# Patient Record
Sex: MALE | Race: WHITE | NOT HISPANIC OR LATINO | Employment: FULL TIME | URBAN - METROPOLITAN AREA
[De-identification: names, ages, dates, MRNs, and addresses within clinical notes are randomized per-mention and may not be internally consistent; named-entity substitution may affect disease eponyms.]

---

## 2017-05-12 RX ORDER — TRAZODONE HYDROCHLORIDE 50 MG/1
TABLET ORAL
Qty: 60 TABLET | Refills: 0 | Status: SHIPPED | OUTPATIENT
Start: 2017-05-12 | End: 2017-06-15 | Stop reason: SDUPTHER

## 2017-05-17 RX ORDER — LEVOTHYROXINE SODIUM 175 UG/1
175 TABLET ORAL EVERY MORNING
Qty: 90 TABLET | Refills: 3 | Status: SHIPPED | OUTPATIENT
Start: 2017-05-17 | End: 2018-05-16 | Stop reason: SDUPTHER

## 2017-05-17 NOTE — TELEPHONE ENCOUNTER
----- Message from Justa Jaramillo sent at 5/17/2017  8:03 AM CDT -----  Contact: Self [phone]  [facility] pt    Pt is requesting a prescription refill for levothyroxine (SYNTHROID, LEVOTHROID) 175 MCG tablet    [place] [address] [address]   [phone] (Phone)  [phone] (Fax)

## 2017-05-18 ENCOUNTER — OFFICE VISIT (OUTPATIENT)
Dept: INTERNAL MEDICINE | Facility: CLINIC | Age: 32
End: 2017-05-18
Payer: COMMERCIAL

## 2017-05-18 ENCOUNTER — LAB VISIT (OUTPATIENT)
Dept: LAB | Facility: HOSPITAL | Age: 32
End: 2017-05-18
Payer: COMMERCIAL

## 2017-05-18 VITALS
HEIGHT: 74 IN | BODY MASS INDEX: 26.71 KG/M2 | WEIGHT: 208.13 LBS | DIASTOLIC BLOOD PRESSURE: 62 MMHG | OXYGEN SATURATION: 97 % | SYSTOLIC BLOOD PRESSURE: 134 MMHG | TEMPERATURE: 99 F | HEART RATE: 62 BPM | RESPIRATION RATE: 16 BRPM

## 2017-05-18 DIAGNOSIS — E83.51 HYPOCALCEMIA: ICD-10-CM

## 2017-05-18 DIAGNOSIS — J06.9 UPPER RESPIRATORY TRACT INFECTION, UNSPECIFIED TYPE: Primary | ICD-10-CM

## 2017-05-18 DIAGNOSIS — C73 THYROID CARCINOMA: ICD-10-CM

## 2017-05-18 LAB
PTH-INTACT SERPL-MCNC: 15 PG/ML
T4 FREE SERPL-MCNC: 1.59 NG/DL
TSH SERPL DL<=0.005 MIU/L-ACNC: 0.19 UIU/ML

## 2017-05-18 PROCEDURE — 84443 ASSAY THYROID STIM HORMONE: CPT

## 2017-05-18 PROCEDURE — 86800 THYROGLOBULIN ANTIBODY: CPT

## 2017-05-18 PROCEDURE — 1160F RVW MEDS BY RX/DR IN RCRD: CPT | Mod: S$GLB,,, | Performed by: INTERNAL MEDICINE

## 2017-05-18 PROCEDURE — 84439 ASSAY OF FREE THYROXINE: CPT

## 2017-05-18 PROCEDURE — 99213 OFFICE O/P EST LOW 20 MIN: CPT | Mod: S$GLB,,, | Performed by: INTERNAL MEDICINE

## 2017-05-18 PROCEDURE — 83970 ASSAY OF PARATHORMONE: CPT

## 2017-05-18 PROCEDURE — 36415 COLL VENOUS BLD VENIPUNCTURE: CPT

## 2017-05-18 PROCEDURE — 99999 PR PBB SHADOW E&M-EST. PATIENT-LVL III: CPT | Mod: PBBFAC,,, | Performed by: INTERNAL MEDICINE

## 2017-05-18 NOTE — PROGRESS NOTES
Subjective:       Patient ID: Antoni Felipe is a 31 y.o. male.    Chief Complaint: Sinus Problem; Sore Throat; and Cough    Sinus Problem   This is a new problem. The current episode started in the past 7 days. The problem is unchanged. There has been no fever. His pain is at a severity of 2/10. The pain is mild. Associated symptoms include sneezing. Pertinent negatives include no congestion, coughing, headaches, shortness of breath, sinus pressure or sore throat. Past treatments include spray decongestants. The treatment provided mild relief.     Review of Systems   Constitutional: Negative for activity change, appetite change and fever.   HENT: Positive for sneezing. Negative for congestion, postnasal drip, sinus pressure and sore throat.    Respiratory: Negative for cough, shortness of breath and wheezing.    Cardiovascular: Negative for chest pain and palpitations.   Gastrointestinal: Negative for abdominal pain, blood in stool, constipation, diarrhea, nausea and vomiting.   Genitourinary: Negative for decreased urine volume, difficulty urinating, flank pain and frequency.   Musculoskeletal: Negative for arthralgias.   Neurological: Negative for dizziness, weakness and headaches.       Objective:      Physical Exam   Constitutional: He is oriented to person, place, and time. He appears well-developed and well-nourished. No distress.   HENT:   Head: Normocephalic and atraumatic.   Right Ear: External ear normal.   Left Ear: External ear normal.   Eyes: Conjunctivae and EOM are normal. Pupils are equal, round, and reactive to light.   Neck: Normal range of motion. Neck supple. No thyromegaly present.   Cardiovascular: Normal rate and regular rhythm.    Pulmonary/Chest: Effort normal and breath sounds normal.   Abdominal: Soft. Bowel sounds are normal. He exhibits no mass. There is no tenderness. There is no rebound and no guarding.   Musculoskeletal: Normal range of motion.   Lymphadenopathy:     He has  no cervical adenopathy.   Neurological: He is alert and oriented to person, place, and time. He has normal reflexes. He displays normal reflexes. No cranial nerve deficit. He exhibits normal muscle tone. Coordination normal.   Skin: Skin is warm and dry.       Assessment:       1. Upper respiratory tract infection, unspecified type        Plan:   Antoni was seen today for sinus problem, sore throat and cough.    Diagnoses and all orders for this visit:    Upper respiratory tract infection, unspecified type

## 2017-05-20 LAB
THRYOGLOBULIN INTERPRETATION: ABNORMAL
THYROGLOB AB SERPL-ACNC: <1.8 IU/ML
THYROGLOB SERPL-MCNC: 1 NG/ML

## 2017-05-31 ENCOUNTER — TELEPHONE (OUTPATIENT)
Dept: ENDOCRINOLOGY | Facility: CLINIC | Age: 32
End: 2017-05-31

## 2017-05-31 DIAGNOSIS — C73 THYROID CANCER: Primary | ICD-10-CM

## 2017-05-31 DIAGNOSIS — E05.80 THYROTOXICOSIS, EXOGENOUS IATROGENIC: ICD-10-CM

## 2017-05-31 DIAGNOSIS — E83.51 HYPOCALCEMIA: ICD-10-CM

## 2017-05-31 NOTE — TELEPHONE ENCOUNTER
----- Message from Maryellen Walker RN sent at 5/31/2017 10:45 AM CDT -----  Contact: Patient      ----- Message -----  From: Kinsey Lorenzana  Sent: 5/24/2017   2:58 PM  To: [facility] Cruz Staff (Endo)    Patient is requesting a call back to discuss 5/18/2017 lab results.    Please call [phone].

## 2017-05-31 NOTE — TELEPHONE ENCOUNTER
S/p thyroidectomy  Hypocalcemia  PTC, stage I    Occasional numbness of hands, responds to calcium  + fatigue  No anxiety or palpitations.   Has lost weight 86 lbs on ketogenic diet    PLAN:   One tablet daily except on sundays take half a tablet  Repeat your in three months   F/u appointment with any endo  Tg 0.9 - 0.7 ng/ml, most recently 1.0ng/ml    Results for orders placed or performed in visit on 05/18/17   TSH   Result Value Ref Range    TSH 0.193 (L) 0.400 - 4.000 uIU/mL   Thyroglobulin   Result Value Ref Range    Thyroglobulin, Tumor Marker 1.0 (H) ng/mL    Thyroglobulin Antibody Screen <1.8 <4.0 IU/mL    Thyroglobulin Interpretation SEE BELOW    PTH, intact   Result Value Ref Range    PTH, Intact 15.0 9.0 - 77.0 pg/mL   T4, free   Result Value Ref Range    Free T4 1.59 (H) 0.71 - 1.51 ng/dL

## 2017-05-31 NOTE — TELEPHONE ENCOUNTER
----- Message from Maryellen Walker RN sent at 5/31/2017 10:41 AM CDT -----  Contact: Patient      ----- Message -----  From: Kinsey Lorenzana  Sent: 5/24/2017   2:58 PM  To: [facility] Cruz Staff (Endo)    Patient is requesting a call back to discuss 5/18/2017 lab results.    Please call [phone].

## 2017-06-16 RX ORDER — TRAZODONE HYDROCHLORIDE 50 MG/1
TABLET ORAL
Qty: 60 TABLET | Refills: 0 | Status: SHIPPED | OUTPATIENT
Start: 2017-06-16 | End: 2017-07-26 | Stop reason: SDUPTHER

## 2017-07-26 RX ORDER — TRAZODONE HYDROCHLORIDE 50 MG/1
TABLET ORAL
Qty: 60 TABLET | Refills: 0 | Status: SHIPPED | OUTPATIENT
Start: 2017-07-26 | End: 2017-09-08 | Stop reason: SDUPTHER

## 2017-09-08 RX ORDER — TRAZODONE HYDROCHLORIDE 50 MG/1
TABLET ORAL
Qty: 60 TABLET | Refills: 0 | Status: SHIPPED | OUTPATIENT
Start: 2017-09-08 | End: 2017-10-18 | Stop reason: SDUPTHER

## 2017-10-19 RX ORDER — TRAZODONE HYDROCHLORIDE 50 MG/1
TABLET ORAL
Qty: 60 TABLET | Refills: 0 | Status: SHIPPED | OUTPATIENT
Start: 2017-10-19 | End: 2017-11-20 | Stop reason: SDUPTHER

## 2017-11-20 RX ORDER — TRAZODONE HYDROCHLORIDE 50 MG/1
TABLET ORAL
Qty: 60 TABLET | Refills: 0 | Status: SHIPPED | OUTPATIENT
Start: 2017-11-20 | End: 2017-12-26 | Stop reason: SDUPTHER

## 2017-12-27 RX ORDER — TRAZODONE HYDROCHLORIDE 50 MG/1
TABLET ORAL
Qty: 60 TABLET | Refills: 0 | Status: SHIPPED | OUTPATIENT
Start: 2017-12-27 | End: 2018-01-29 | Stop reason: SDUPTHER

## 2018-01-30 RX ORDER — TRAZODONE HYDROCHLORIDE 50 MG/1
TABLET ORAL
Qty: 60 TABLET | Refills: 0 | Status: SHIPPED | OUTPATIENT
Start: 2018-01-30 | End: 2018-03-10 | Stop reason: SDUPTHER

## 2018-03-12 RX ORDER — TRAZODONE HYDROCHLORIDE 50 MG/1
TABLET ORAL
Qty: 60 TABLET | Refills: 0 | Status: SHIPPED | OUTPATIENT
Start: 2018-03-12 | End: 2018-04-08 | Stop reason: SDUPTHER

## 2018-04-09 RX ORDER — TRAZODONE HYDROCHLORIDE 50 MG/1
TABLET ORAL
Qty: 60 TABLET | Refills: 0 | Status: SHIPPED | OUTPATIENT
Start: 2018-04-09 | End: 2018-05-06 | Stop reason: SDUPTHER

## 2018-04-11 RX ORDER — LEVOTHYROXINE SODIUM 175 UG/1
TABLET ORAL
Qty: 90 TABLET | Refills: 3 | OUTPATIENT
Start: 2018-04-11

## 2018-04-30 RX ORDER — LEVOTHYROXINE SODIUM 175 UG/1
TABLET ORAL
Qty: 90 TABLET | Refills: 3 | OUTPATIENT
Start: 2018-04-30

## 2018-04-30 NOTE — TELEPHONE ENCOUNTER
See Dr. Ortiz's lab orders and please please call patient to schedule ordered lab(s). Thank you.

## 2018-05-02 ENCOUNTER — PATIENT MESSAGE (OUTPATIENT)
Dept: ENDOCRINOLOGY | Facility: CLINIC | Age: 33
End: 2018-05-02

## 2018-05-02 DIAGNOSIS — Z00.00 ROUTINE GENERAL MEDICAL EXAMINATION AT A HEALTH CARE FACILITY: Primary | ICD-10-CM

## 2018-05-07 RX ORDER — TRAZODONE HYDROCHLORIDE 50 MG/1
TABLET ORAL
Qty: 60 TABLET | Refills: 0 | Status: SHIPPED | OUTPATIENT
Start: 2018-05-07 | End: 2018-05-15 | Stop reason: SDUPTHER

## 2018-05-15 ENCOUNTER — OFFICE VISIT (OUTPATIENT)
Dept: INTERNAL MEDICINE | Facility: CLINIC | Age: 33
End: 2018-05-15
Attending: FAMILY MEDICINE
Payer: COMMERCIAL

## 2018-05-15 ENCOUNTER — LAB VISIT (OUTPATIENT)
Dept: LAB | Facility: HOSPITAL | Age: 33
End: 2018-05-15
Attending: INTERNAL MEDICINE
Payer: COMMERCIAL

## 2018-05-15 VITALS
SYSTOLIC BLOOD PRESSURE: 114 MMHG | HEART RATE: 85 BPM | OXYGEN SATURATION: 97 % | HEIGHT: 74 IN | DIASTOLIC BLOOD PRESSURE: 68 MMHG | BODY MASS INDEX: 30.16 KG/M2 | WEIGHT: 235 LBS | TEMPERATURE: 98 F

## 2018-05-15 DIAGNOSIS — Z00.00 ANNUAL PHYSICAL EXAM: Primary | ICD-10-CM

## 2018-05-15 DIAGNOSIS — E83.51 HYPOCALCEMIA: ICD-10-CM

## 2018-05-15 DIAGNOSIS — E03.9 HYPOTHYROIDISM (ACQUIRED): ICD-10-CM

## 2018-05-15 DIAGNOSIS — E05.80 THYROTOXICOSIS, EXOGENOUS IATROGENIC: ICD-10-CM

## 2018-05-15 DIAGNOSIS — C73 THYROID CANCER: ICD-10-CM

## 2018-05-15 LAB
ALBUMIN SERPL BCP-MCNC: 4.4 G/DL
ANION GAP SERPL CALC-SCNC: 11 MMOL/L
BUN SERPL-MCNC: 21 MG/DL
CA-I BLDV-SCNC: 1.05 MMOL/L
CALCIUM SERPL-MCNC: 8.7 MG/DL
CHLORIDE SERPL-SCNC: 101 MMOL/L
CO2 SERPL-SCNC: 28 MMOL/L
CREAT SERPL-MCNC: 1.1 MG/DL
EST. GFR  (AFRICAN AMERICAN): >60 ML/MIN/1.73 M^2
EST. GFR  (NON AFRICAN AMERICAN): >60 ML/MIN/1.73 M^2
GLUCOSE SERPL-MCNC: 96 MG/DL
PHOSPHATE SERPL-MCNC: 4.8 MG/DL
POTASSIUM SERPL-SCNC: 4.6 MMOL/L
PTH-INTACT SERPL-MCNC: 26 PG/ML
SODIUM SERPL-SCNC: 140 MMOL/L
TSH SERPL DL<=0.005 MIU/L-ACNC: 1.85 UIU/ML

## 2018-05-15 PROCEDURE — 83970 ASSAY OF PARATHORMONE: CPT

## 2018-05-15 PROCEDURE — 84432 ASSAY OF THYROGLOBULIN: CPT

## 2018-05-15 PROCEDURE — 82330 ASSAY OF CALCIUM: CPT

## 2018-05-15 PROCEDURE — 80069 RENAL FUNCTION PANEL: CPT

## 2018-05-15 PROCEDURE — 99999 PR PBB SHADOW E&M-EST. PATIENT-LVL III: CPT | Mod: PBBFAC,,, | Performed by: FAMILY MEDICINE

## 2018-05-15 PROCEDURE — 84443 ASSAY THYROID STIM HORMONE: CPT

## 2018-05-15 PROCEDURE — 99395 PREV VISIT EST AGE 18-39: CPT | Mod: S$GLB,,, | Performed by: FAMILY MEDICINE

## 2018-05-15 PROCEDURE — 36415 COLL VENOUS BLD VENIPUNCTURE: CPT

## 2018-05-15 RX ORDER — TRAZODONE HYDROCHLORIDE 50 MG/1
50-100 TABLET ORAL NIGHTLY PRN
Qty: 60 TABLET | Refills: 5 | Status: SHIPPED | OUTPATIENT
Start: 2018-05-15 | End: 2018-12-16 | Stop reason: SDUPTHER

## 2018-05-15 NOTE — PROGRESS NOTES
Subjective:       Patient ID: Antoni Felipe is a 32 y.o. male.    Chief Complaint: Medication Refill    Established patient for an annual wellness check/physical exam and also chronic disease management. Specific complaints - see dictation and please see ROS.        Medication Refill   Pertinent negatives include no abdominal pain, arthralgias, chest pain, chills, congestion, coughing, fatigue, fever, headaches, joint swelling, myalgias, neck pain, numbness, rash or weakness.     Review of Systems   Constitutional: Negative for chills, fatigue and fever.   HENT: Negative for congestion and trouble swallowing.    Eyes: Negative for redness.   Respiratory: Negative for cough, chest tightness and shortness of breath.    Cardiovascular: Negative for chest pain, palpitations and leg swelling.   Gastrointestinal: Negative for abdominal pain and blood in stool.   Genitourinary: Negative for hematuria.   Musculoskeletal: Negative for arthralgias, back pain, gait problem, joint swelling, myalgias and neck pain.   Skin: Negative for color change and rash.   Neurological: Negative for tremors, speech difficulty, weakness, numbness and headaches.   Hematological: Negative for adenopathy. Does not bruise/bleed easily.   Psychiatric/Behavioral: Positive for sleep disturbance. Negative for behavioral problems and confusion. The patient is not nervous/anxious.        Objective:      Physical Exam   Constitutional: He is oriented to person, place, and time. He appears well-developed and well-nourished.   Eyes: No scleral icterus.   Neck: Normal range of motion. Neck supple. No JVD present. Carotid bruit is not present. No tracheal deviation present. No thyromegaly present.   Cardiovascular: Normal rate, regular rhythm, normal heart sounds and intact distal pulses.  Exam reveals no gallop and no friction rub.    No murmur heard.  Pulmonary/Chest: Effort normal and breath sounds normal. No respiratory distress. He has no  wheezes. He has no rales.   Abdominal: Soft. Bowel sounds are normal. He exhibits no distension and no mass. There is no tenderness. There is no rebound and no guarding.   Musculoskeletal: He exhibits no edema.   Lymphadenopathy:     He has no cervical adenopathy.   Neurological: He is alert and oriented to person, place, and time. He displays no tremor. No cranial nerve deficit. Coordination and gait normal.   Skin: Skin is warm and dry. No rash noted. He is not diaphoretic. No erythema.   Psychiatric: He has a normal mood and affect. His behavior is normal. Judgment and thought content normal.   Nursing note and vitals reviewed.      Assessment:       1. Annual physical exam    2. Thyroid cancer    3. Hypothyroidism (acquired)    4. Hypocalcemia        Plan:   Antoni was seen today for medication refill.    Diagnoses and all orders for this visit:    Annual physical exam    Thyroid cancer    Hypothyroidism (acquired)    Hypocalcemia  -     Calcium, ionized; Future    Other orders  -     traZODone (DESYREL) 50 MG tablet; Take 1-2 tablets ( mg total) by mouth nightly as needed for Insomnia.      See meds, orders, follow up, routing and instructions sections of encounter.    This patient is in for an annual physical examination.  He states he is having   trouble getting in with the Endocrinology Service.  They have been managing his   thyroid medication and laboratory status post thyroidectomy for papillary   carcinoma.  His last laboratory demonstrated elevated FT4 and decreased TSH.  It   is unclear what the goal levels are.  The patient states that he had some   appointments with new providers in the Endocrinology Service and those   appointments were periodically cancelled.  He has a pending appointment with Dr. Leslie in approximately one month and runs out of medicines in approximately   four days.    His condition is complicated by hypocalcemia secondary to possible parathyroid   replacement issue.  He  states a ketogenic diet will help his symptoms and he is   not currently taking calcitriol.    RECOMMENDATIONS:  Check ionized calcium today by me and complete his pending   Endocrinology Service laboratory.    I explained that I would like to continue have Endocrinology manage his   post-surgical hypothyroidism given that we do not have direct guidance on his   goal levels be it complete euthyroid status or suppression over control.    Awaiting laboratory.  Further recommendations to follow.  I did urge him to keep   his appointment with Dr. Leslie in June and will curtsey copy our Endocrinology   providers.      LORNA/ABMIKA  dd: 05/16/2018 06:00:38 (CDT)  td: 05/16/2018 13:39:42 (CDT)  Doc ID   #[id number]  Job ID #[id number]    CC:        Cc Dr. Leslie

## 2018-05-15 NOTE — Clinical Note
and Angel were following - patient had Dr. Ledesma standing labs done today. His med refills had been going through the endocrinology department. He has an appt with you in June and says that he might have trouble making that. States he needs synthroid refilled. Thank you. AUSTEN

## 2018-05-16 ENCOUNTER — TELEPHONE (OUTPATIENT)
Dept: ENDOCRINOLOGY | Facility: CLINIC | Age: 33
End: 2018-05-16

## 2018-05-16 ENCOUNTER — TELEPHONE (OUTPATIENT)
Dept: INTERNAL MEDICINE | Facility: CLINIC | Age: 33
End: 2018-05-16

## 2018-05-16 RX ORDER — LEVOTHYROXINE SODIUM 175 UG/1
175 TABLET ORAL EVERY MORNING
Qty: 90 TABLET | Refills: 0 | Status: SHIPPED | OUTPATIENT
Start: 2018-05-16 | End: 2018-05-20

## 2018-05-16 NOTE — TELEPHONE ENCOUNTER
Please advise patient I looked at his labs. His calcium levels remain low normal to slightly low. I recommend that he take scheduled calcium carbonate 500 mg twice daily with meals. His TSH is normal, so I recommend he continue the current dose of thyroid hormone as prescribed by Dr. Mackenzie. Please encourage him to make the appt with me as he hasn't been seen by endocrinology since 5/2016. I am awaiting the result of the thyroglobulin level.

## 2018-05-16 NOTE — TELEPHONE ENCOUNTER
----- Message from Devin Chacon MD sent at 5/16/2018 10:56 AM CDT -----  His  TSH was normal, so I sent in his standard dose today. The ionized Ca was a little low. Thank you.  ----- Message -----  From: Lázaro Leslie MD  Sent: 5/16/2018   9:31 AM  To: Devin Chacon MD    Thank you for letting me know. I'll send in a refill for the thyroid hormone.   ----- Message -----  From: Devin Chacon MD  Sent: 5/15/2018   3:36 PM  To: MD Terry Belcher and Angel were following - patient had Dr. Ledesma standing labs done today. His med refills had been going through the endocrinology department. He has an appt with you in June and says that he might have trouble making that. States he needs synthroid refilled. Thank you. MM

## 2018-05-16 NOTE — TELEPHONE ENCOUNTER
Called patient and discussed labs and or test results. Patient expressed understanding and had the opportunity to ask questions. Any questions were answered. See meds, orders, follow up and instructions sections of encounter.    Specific issues include:  Labs - ion ized Ca low, standart Ca OK - has appt w/ Anuj Jacobson next month, urged him to keep it. Will RF meds for 90 days - TSH in nml range.

## 2018-05-16 NOTE — TELEPHONE ENCOUNTER
Spoke to patient and let him know that Dr Leslie looked at his labs. His calcium levels remain low normal to slightly low. He recommend that he take scheduled calcium carbonate 500 mg twice daily with meals. His TSH is normal, so he recommend he continue the current dose of thyroid hormone as prescribed by Dr. Mackenzie. Please encourage him to make the appt with me as he hasn't been seen by endocrinology since 5/2016. He is awaiting the result of the thyroglobulin level.

## 2018-05-17 LAB
THRYOGLOBULIN INTERPRETATION: ABNORMAL
THYROGLOB AB SERPL-ACNC: <1.8 IU/ML
THYROGLOB SERPL-MCNC: 1.9 NG/ML

## 2018-05-20 RX ORDER — LEVOTHYROXINE SODIUM 175 UG/1
TABLET ORAL
Qty: 90 TABLET | Refills: 1 | Status: SHIPPED | OUTPATIENT
Start: 2018-05-20 | End: 2018-06-22 | Stop reason: SDUPTHER

## 2018-05-21 ENCOUNTER — PATIENT MESSAGE (OUTPATIENT)
Dept: ENDOCRINOLOGY | Facility: CLINIC | Age: 33
End: 2018-05-21

## 2018-05-21 DIAGNOSIS — C73 THYROID CANCER: Primary | ICD-10-CM

## 2018-05-22 ENCOUNTER — PATIENT MESSAGE (OUTPATIENT)
Dept: ENDOCRINOLOGY | Facility: CLINIC | Age: 33
End: 2018-05-22

## 2018-06-22 ENCOUNTER — OFFICE VISIT (OUTPATIENT)
Dept: ENDOCRINOLOGY | Facility: CLINIC | Age: 33
End: 2018-06-22
Payer: COMMERCIAL

## 2018-06-22 VITALS
BODY MASS INDEX: 29.68 KG/M2 | HEIGHT: 74 IN | WEIGHT: 231.25 LBS | HEART RATE: 68 BPM | SYSTOLIC BLOOD PRESSURE: 116 MMHG | DIASTOLIC BLOOD PRESSURE: 68 MMHG

## 2018-06-22 DIAGNOSIS — E89.0 POSTOPERATIVE HYPOTHYROIDISM: ICD-10-CM

## 2018-06-22 DIAGNOSIS — E83.51 HYPOCALCEMIA: ICD-10-CM

## 2018-06-22 DIAGNOSIS — C73 THYROID CANCER: Primary | ICD-10-CM

## 2018-06-22 PROCEDURE — 99999 PR PBB SHADOW E&M-EST. PATIENT-LVL III: CPT | Mod: PBBFAC,,, | Performed by: INTERNAL MEDICINE

## 2018-06-22 PROCEDURE — 99214 OFFICE O/P EST MOD 30 MIN: CPT | Mod: S$GLB,,, | Performed by: INTERNAL MEDICINE

## 2018-06-22 RX ORDER — LEVOTHYROXINE SODIUM 175 UG/1
TABLET ORAL
Qty: 96 TABLET | Refills: 3 | Status: SHIPPED | OUTPATIENT
Start: 2018-06-22 | End: 2018-07-28 | Stop reason: SDUPTHER

## 2018-06-22 NOTE — PROGRESS NOTES
Subjective:      Patient ID: Cuauhtemoc Lane is a 32 y.o. male.    Chief Complaint:  Thyroid Problem      History of Present Illness  Mr. Lane presents for follow up of thyroid cancer and postoperative hypothyroidism.     Pt is here for management of differentiated thyroid cancer and postsurgical hypothyroidism.  S/p thyroidectomy 6/2015    FINAL PATHOLOGIC DIAGNOSIS  1. TOTAL THYROIDECTOMY SPECIMEN SHOWING 3 FOCI OF PAPILLARY CARCINOMA. 1 IS IN THE  RIGHT LOBE, ONE IN THE ISTHMUS AND ONE IS IN THE LEFT LOBE. SEE SYNOPTIC REPORT:  PROCEDURE: TOTAL THYROIDECTOMY  LYMPH NODE SAMPLING: CENTRAL NECK DISSECTION  TUMOR FOCALITY: MULTIFOCAL (3)  TUMOR LATERALITY: RIGHT LOBE, LEFT LOBE AND ISTHMUS  TUMOR SIZE: 1.1 CM  HISTOLOGIC TYPE: PAPILLARY CARCINOMA, CLASSICAL  MARGINS: UNINVOLVED BY PAPILLARY CARCINOMA. THE TUMOR APPROACHES BUT DOES NOT  INVOLVE THE OVERLYING INKED SURFACE IN BOTH THE RIGHT LOBE AND ISTHMUS.  ANGIOINVASION: NOT IDENTIFIED  LYMPHATIC INVASION: NOT IDENTIFIED  EXTRATHYROIDAL EXTENSION: NOT IDENTIFIED  TUMOR STAGE: pT1 b N1a  2. SPECIMEN LABELED CENTRAL NECK CONTENTS SHOWS 2 OUT OF 3 (2/3) LYMPH NODES WITH  METASTATIC PAPILLARY CARCINOMA.    No family history of thyroid cancer.  No history of head or neck irradiation.     Still with mild hypocalcemia.  PTH is detectable but inappropriately in the low normal range  Gets occasional numbness and tingling on the nasal bridge every 3-4 months, no perioral numbness/tingling or muscle cramping  Calcium carbonate 500 mg daily with dinner     No neck pain or dysphagia    Currently taking 175 mcg of levothyroxine daily. Takes thyroid hormone on empty stomach and separates from other foods and meds. Doesn't miss any doses.    Has always felt sluggish since surgery. Weight fluctuates. Regular BM's. No heat or cold intolerance.  No heart palpitations, tremors or increased anxiousness.      Results for [address] (MRN [id number]) as of 6/22/2018  07:44   Ref. Range 4/23/2016 08:30 9/27/2016 10:20 5/18/2017 17:01 5/15/2018 15:51   Thyroglobulin Antibody Screen Latest Ref Range: <4.0 IU/mL <1.8  <1.8 <1.8   Thyroglobulin, Tumor Marker Latest Units: ng/mL 0.7 (H)  1.0 (H) 1.9 (H)       Review of Systems   Constitutional: Negative for chills and fever.   Gastrointestinal: Negative for nausea and vomiting.   No CP  No SOB    Objective:   Physical Exam   Nursing note and vitals reviewed.  No thyroid tissue palpated  No tremor  DTR's 2 +  Heart RRR  Lungs CTA b/l    Lab Review:   Results for [address] (MRN [id number]) as of 6/22/2018 07:27   Ref. Range 9/27/2016 10:20 5/18/2017 17:01 5/15/2018 15:51   Sodium Latest Ref Range: 136 - 145 mmol/L 140  140   Potassium Latest Ref Range: 3.5 - 5.1 mmol/L 4.3  4.6   Chloride Latest Ref Range: 95 - 110 mmol/L 103  101   CO2 Latest Ref Range: 23 - 29 mmol/L 29  28   Anion Gap Latest Ref Range: 8 - 16 mmol/L 8  11   BUN, Bld Latest Ref Range: 6 - 20 mg/dL 15  21 (H)   Creatinine Latest Ref Range: 0.5 - 1.4 mg/dL 0.9  1.1   eGFR if non African American Latest Ref Range: >60 mL/min/1.73 m^2 >60.0  >60.0   eGFR if African American Latest Ref Range: >60 mL/min/1.73 m^2 >60.0  >60.0   Glucose Latest Ref Range: 70 - 110 mg/dL 103  96   Calcium Latest Ref Range: 8.7 - 10.5 mg/dL 8.3 (L)  8.7   Calcium, Ion Latest Ref Range: 1.06 - 1.42 mmol/L   1.05 (L)   Phosphorus Latest Ref Range: 2.7 - 4.5 mg/dL   4.8 (H)   Albumin Latest Ref Range: 3.5 - 5.2 g/dL   4.4   Triglycerides Latest Ref Range: 30 - 150 mg/dL 143     Cholesterol Latest Ref Range: 120 - 199 mg/dL 158     HDL Latest Ref Range: 40 - 75 mg/dL 34 (L)     LDL Cholesterol Latest Ref Range: 63.0 - 159.0 mg/dL 95.4     Total Cholesterol/HDL Ratio Latest Ref Range: 2.0 - 5.0  4.6     TSH Latest Ref Range: 0.400 - 4.000 uIU/mL  0.193 (L) 1.852   Free T4 Latest Ref Range: 0.71 - 1.51 ng/dL  1.59 (H)    Thyroglobulin Interpretation Unknown  SEE BELOW SEE BELOW    Thyroglobulin Antibody Screen Latest Ref Range: <4.0 IU/mL  <1.8 <1.8   Thyroglobulin, Tumor Marker Latest Units: ng/mL  1.0 (H) 1.9 (H)   PTH Latest Ref Range: 9.0 - 77.0 pg/mL  15.0 26.0       Assessment:     1. Thyroid cancer    2. Hypocalcemia    3. Postoperative hypothyroidism      Plan:     1.) Patient with papillary thyroid cancer stage 1, YESI low risk for recurrence  --S/p total thyroidectomy in 2015 with 2 of 3 CN nodes positive for mets  --Was decided not to give him COSTA up front due to YESI low risk category  --Had stable low level Tg consistent with remnant but Tg now trending up  --Will check thyroid ultrasound as soon as able to look for evidence of structural disease  --Will aim to keep TSH moderately suppressed  ---If not structural disease and Tg goes back to baseline with suppression may observe, if Tg continues to trend up may consider COSTA ablation     2. Mild postsurgical hypoparathyroidism  --To continue calcium carbonate 500 mg daily    3.) Biochemically and clinically euthyroid but aiming for TSH suppression  --Increase levothyroxine to 175 mcg Mon-Sat with 1.5 tablets on Sunday  --Repeat TFT's in 4-6 weeks    RTC in 6 months    Lázaro Leslie M.D. Staff Endocrinology

## 2018-07-25 ENCOUNTER — TELEPHONE (OUTPATIENT)
Dept: ENDOCRINOLOGY | Facility: CLINIC | Age: 33
End: 2018-07-25

## 2018-07-25 DIAGNOSIS — C73 THYROID CANCER: Primary | ICD-10-CM

## 2018-07-25 NOTE — TELEPHONE ENCOUNTER
----- Message from Melba Peralta sent at 7/25/2018 10:10 AM CDT -----  Pr seeing ander 9/14 .... ultrasoound on 8/31 being booked out .   Can pt have done in  Radiology ??   Call me [phone]

## 2018-07-28 ENCOUNTER — LAB VISIT (OUTPATIENT)
Dept: LAB | Facility: HOSPITAL | Age: 33
End: 2018-07-28
Attending: INTERNAL MEDICINE
Payer: COMMERCIAL

## 2018-07-28 ENCOUNTER — PATIENT MESSAGE (OUTPATIENT)
Dept: ENDOCRINOLOGY | Facility: CLINIC | Age: 33
End: 2018-07-28

## 2018-07-28 DIAGNOSIS — E83.51 HYPOCALCEMIA: ICD-10-CM

## 2018-07-28 DIAGNOSIS — E89.0 POSTOPERATIVE HYPOTHYROIDISM: Primary | ICD-10-CM

## 2018-07-28 DIAGNOSIS — C73 THYROID CANCER: ICD-10-CM

## 2018-07-28 DIAGNOSIS — E89.0 POSTOPERATIVE HYPOTHYROIDISM: ICD-10-CM

## 2018-07-28 LAB
25(OH)D3+25(OH)D2 SERPL-MCNC: 34 NG/ML
T4 FREE SERPL-MCNC: 1.36 NG/DL
TSH SERPL DL<=0.005 MIU/L-ACNC: 1.62 UIU/ML

## 2018-07-28 PROCEDURE — 84439 ASSAY OF FREE THYROXINE: CPT

## 2018-07-28 PROCEDURE — 84443 ASSAY THYROID STIM HORMONE: CPT

## 2018-07-28 PROCEDURE — 82306 VITAMIN D 25 HYDROXY: CPT

## 2018-07-28 PROCEDURE — 36415 COLL VENOUS BLD VENIPUNCTURE: CPT

## 2018-07-28 RX ORDER — LEVOTHYROXINE SODIUM 200 UG/1
200 TABLET ORAL DAILY
Qty: 90 TABLET | Refills: 3 | Status: SHIPPED | OUTPATIENT
Start: 2018-07-28 | End: 2018-08-06 | Stop reason: SDUPTHER

## 2018-07-30 ENCOUNTER — PATIENT MESSAGE (OUTPATIENT)
Dept: ENDOCRINOLOGY | Facility: CLINIC | Age: 33
End: 2018-07-30

## 2018-08-06 RX ORDER — LEVOTHYROXINE SODIUM 200 UG/1
200 TABLET ORAL DAILY
Qty: 90 TABLET | Refills: 3 | Status: SHIPPED | OUTPATIENT
Start: 2018-08-06 | End: 2018-08-31

## 2018-08-27 ENCOUNTER — LAB VISIT (OUTPATIENT)
Dept: LAB | Facility: HOSPITAL | Age: 33
End: 2018-08-27
Attending: INTERNAL MEDICINE
Payer: COMMERCIAL

## 2018-08-27 DIAGNOSIS — E89.0 POSTOPERATIVE HYPOTHYROIDISM: ICD-10-CM

## 2018-08-27 LAB — TSH SERPL DL<=0.005 MIU/L-ACNC: 1.91 UIU/ML

## 2018-08-27 PROCEDURE — 84443 ASSAY THYROID STIM HORMONE: CPT

## 2018-08-27 PROCEDURE — 36415 COLL VENOUS BLD VENIPUNCTURE: CPT

## 2018-08-31 ENCOUNTER — PATIENT MESSAGE (OUTPATIENT)
Dept: ENDOCRINOLOGY | Facility: CLINIC | Age: 33
End: 2018-08-31

## 2018-08-31 DIAGNOSIS — E89.0 POSTOPERATIVE HYPOTHYROIDISM: Primary | ICD-10-CM

## 2018-08-31 RX ORDER — LEVOTHYROXINE SODIUM 200 UG/1
TABLET ORAL
Qty: 96 TABLET | Refills: 3 | Status: SHIPPED | OUTPATIENT
Start: 2018-08-31 | End: 2019-07-22

## 2018-09-04 ENCOUNTER — HOSPITAL ENCOUNTER (OUTPATIENT)
Dept: ENDOCRINOLOGY | Facility: CLINIC | Age: 33
Discharge: HOME | End: 2018-09-04
Attending: INTERNAL MEDICINE
Payer: COMMERCIAL

## 2018-09-04 DIAGNOSIS — C73 THYROID CANCER: ICD-10-CM

## 2018-09-04 PROCEDURE — 76536 US EXAM OF HEAD AND NECK: CPT | Mod: S$GLB,,, | Performed by: INTERNAL MEDICINE

## 2018-09-11 ENCOUNTER — PATIENT MESSAGE (OUTPATIENT)
Dept: ENDOCRINOLOGY | Facility: CLINIC | Age: 33
End: 2018-09-11

## 2018-09-12 ENCOUNTER — PATIENT MESSAGE (OUTPATIENT)
Dept: ENDOCRINOLOGY | Facility: CLINIC | Age: 33
End: 2018-09-12

## 2018-11-06 NOTE — PROGRESS NOTES
Subjective:      Patient ID: Antoni Felipe is a 32 y.o. male.    Chief Complaint:  Acquired hypothyroidism    History of Present Illness:    Acquired Hypothyroidism:  A 31 yo man with papillary thyroid cancer s/p total thyroidectomy (June, 2015), presents for follow up.    He was diagnosed with papillary thyroid cancer stage 1, YESI low risk for recurrence. Thus he didn't receive COSTA post-operatively.    Pt is currently taking levothyroxine 200 mcg Monday thru Sat, and 300 mcg on Sundays.   Reports taking it on an empty stomach every morning.     Reports fatigue, most likely work related (works 16 hour shifts and timings vary everyday).  Denies weight gain, constipation, hair loss, brittle nails.    No family history of thyroid cancer.  No history of head or neck irradiation.    Mild surgical hypoparathyroidism:  -Reports symptoms of numbness & tingling in the face about once a week to once a month depending on his diet.   -Doesn't take calcium supplements daily. Reports taking 3-4 calcium carbonate+ vitamin D3 tablets when symptomatic.         Review of Systems   Constitutional: Positive for fatigue. Negative for unexpected weight change.   Eyes: Negative for visual disturbance.   Respiratory: Negative for shortness of breath.    Cardiovascular: Negative for chest pain.   Gastrointestinal: Negative for abdominal pain.   Genitourinary: Negative for urgency.   Musculoskeletal: Negative for arthralgias.   Skin: Negative for wound.   Neurological: Negative for headaches.   Hematological: Does not bruise/bleed easily.   Psychiatric/Behavioral: Negative for sleep disturbance.       Objective:   Physical Exam   Constitutional: He is oriented to person, place, and time. He appears well-developed and well-nourished.   HENT:   Head: Normocephalic and atraumatic.   Neck: Neck supple. No thyromegaly present.   Cardiovascular: Normal rate, regular rhythm and normal heart sounds.   Pulmonary/Chest: Effort normal. No  respiratory distress.   Abdominal: Soft. There is no tenderness.   Neurological: He is alert and oriented to person, place, and time.   Skin: Skin is warm. No rash noted.   Psychiatric: He has a normal mood and affect. His behavior is normal.       Lab Review:   Results for [address] (MRN [id number]) as of 11/6/2018 10:57   Ref. Range 5/15/2018 15:51 7/28/2018 08:14 8/27/2018 07:47   TSH Latest Ref Range: 0.400 - 4.000 uIU/mL 1.852 1.623 1.906   Free T4 Latest Ref Range: 0.71 - 1.51 ng/dL  1.36    Thyroglobulin Interpretation Unknown SEE BELOW     Thyroglobulin Antibody Screen Latest Ref Range: <4.0 IU/mL <1.8     Thyroglobulin, Tumor Marker Latest Units: ng/mL 1.9 (H)     PTH Latest Ref Range: 9.0 - 77.0 pg/mL 26.0       Thyroid US (9/6/18)    Impression:  Structure consistent with lymph node in L thyroid bed with higher risk characteristics (height/width ratio >0.7 in AP view, lack of hilum).  There is another lymph node in L level IIA without clear hilum.  Assessment & Plan:   Postoperative hypothyroidism  Pt is clinically & biochemically euthyroid    -Continue Levothyroxine 200 mcg Monday thru Sat, and 300 mcg on Sundays.   -Will check TFTs   -Goal TSH: 0.1-0.5 pending FNA. If pathology is consistent with malignancy, would aim for TSH<0.1  -Reviewed thyroid US. Will schedule FNA thyroid for 11/9.    Hypocalcemia  Pt has intermittent symptoms of hypocalcemia secondary to postsurgical hypoparathyroidism.     -He is not compliant with calcium carbonate. Reports taking it only when symptomatic.   -Recommended taking 1 tab calcium carbonate 500 mg daily  -Will check serum calcium level and phosphorus level    Thyroid cancer  S/p total thyroidectomy    -Will check thyroglobulin level today      Discussed with Dr. Leslie

## 2018-11-07 ENCOUNTER — OFFICE VISIT (OUTPATIENT)
Dept: ENDOCRINOLOGY | Facility: CLINIC | Age: 33
End: 2018-11-07
Payer: COMMERCIAL

## 2018-11-07 ENCOUNTER — LAB VISIT (OUTPATIENT)
Dept: LAB | Facility: HOSPITAL | Age: 33
End: 2018-11-07
Payer: COMMERCIAL

## 2018-11-07 VITALS
HEIGHT: 74 IN | DIASTOLIC BLOOD PRESSURE: 72 MMHG | BODY MASS INDEX: 29.65 KG/M2 | SYSTOLIC BLOOD PRESSURE: 120 MMHG | WEIGHT: 231 LBS | HEART RATE: 70 BPM

## 2018-11-07 DIAGNOSIS — E89.0 POSTOPERATIVE HYPOTHYROIDISM: ICD-10-CM

## 2018-11-07 DIAGNOSIS — E83.51 HYPOCALCEMIA: ICD-10-CM

## 2018-11-07 DIAGNOSIS — C73 THYROID CANCER: ICD-10-CM

## 2018-11-07 DIAGNOSIS — E83.51 HYPOCALCEMIA: Primary | ICD-10-CM

## 2018-11-07 LAB
ALBUMIN SERPL BCP-MCNC: 4.2 G/DL
ANION GAP SERPL CALC-SCNC: 7 MMOL/L
BUN SERPL-MCNC: 16 MG/DL
CALCIUM SERPL-MCNC: 8.5 MG/DL
CHLORIDE SERPL-SCNC: 103 MMOL/L
CO2 SERPL-SCNC: 30 MMOL/L
CREAT SERPL-MCNC: 0.9 MG/DL
EST. GFR  (AFRICAN AMERICAN): >60 ML/MIN/1.73 M^2
EST. GFR  (NON AFRICAN AMERICAN): >60 ML/MIN/1.73 M^2
GLUCOSE SERPL-MCNC: 94 MG/DL
PHOSPHATE SERPL-MCNC: 4.1 MG/DL
POTASSIUM SERPL-SCNC: 4.6 MMOL/L
SODIUM SERPL-SCNC: 140 MMOL/L
T4 FREE SERPL-MCNC: 1.51 NG/DL
TSH SERPL DL<=0.005 MIU/L-ACNC: 0.3 UIU/ML

## 2018-11-07 PROCEDURE — 99999 PR PBB SHADOW E&M-EST. PATIENT-LVL III: CPT | Mod: PBBFAC,,, | Performed by: INTERNAL MEDICINE

## 2018-11-07 PROCEDURE — 84443 ASSAY THYROID STIM HORMONE: CPT

## 2018-11-07 PROCEDURE — 99214 OFFICE O/P EST MOD 30 MIN: CPT | Mod: S$GLB,,, | Performed by: INTERNAL MEDICINE

## 2018-11-07 PROCEDURE — 80069 RENAL FUNCTION PANEL: CPT

## 2018-11-07 PROCEDURE — 36415 COLL VENOUS BLD VENIPUNCTURE: CPT

## 2018-11-07 PROCEDURE — 86800 THYROGLOBULIN ANTIBODY: CPT

## 2018-11-07 PROCEDURE — 84439 ASSAY OF FREE THYROXINE: CPT

## 2018-11-07 NOTE — PROGRESS NOTES
I have reviewed and concur with the fellows history, physical, assessment, and plan.  I have personally interviewed the patient and all questions were answered.

## 2018-11-07 NOTE — ASSESSMENT & PLAN NOTE
Pt has intermittent symptoms of hypocalcemia secondary to postsurgical hypoparathyroidism.     -He is not compliant with calcium carbonate. Reports taking it only when symptomatic.   -Recommended taking 1 tab calcium carbonate 500 mg daily  -Will check serum calcium level and phosphorus level

## 2018-11-07 NOTE — ASSESSMENT & PLAN NOTE
Pt is clinically & biochemically euthyroid    -Continue Levothyroxine 200 mcg Monday thru Sat, and 300 mcg on Sundays.   -Will check TFTs   -Goal TSH: 0.1-0.5 pending FNA. If pathology is consistent with malignancy, would aim for TSH<0.1  -Reviewed thyroid US. Will schedule FNA thyroid for 11/9.

## 2018-11-08 LAB
THRYOGLOBULIN INTERPRETATION: ABNORMAL
THYROGLOB AB SERPL-ACNC: <1.8 IU/ML
THYROGLOB SERPL-MCNC: 0.9 NG/ML

## 2018-12-17 RX ORDER — TRAZODONE HYDROCHLORIDE 50 MG/1
TABLET ORAL
Qty: 60 TABLET | Refills: 5 | Status: SHIPPED | OUTPATIENT
Start: 2018-12-17 | End: 2019-07-10 | Stop reason: SDUPTHER

## 2019-01-04 ENCOUNTER — HOSPITAL ENCOUNTER (OUTPATIENT)
Dept: ENDOCRINOLOGY | Facility: CLINIC | Age: 34
Discharge: HOME | End: 2019-01-04
Attending: STUDENT IN AN ORGANIZED HEALTH CARE EDUCATION/TRAINING PROGRAM
Payer: COMMERCIAL

## 2019-01-04 DIAGNOSIS — E89.0 POSTOPERATIVE HYPOTHYROIDISM: ICD-10-CM

## 2019-01-04 DIAGNOSIS — C73 THYROID CANCER: Primary | ICD-10-CM

## 2019-01-04 PROCEDURE — 10006 FNA BX W/US GDN EA ADDL: CPT | Mod: S$GLB,,, | Performed by: INTERNAL MEDICINE

## 2019-01-04 PROCEDURE — 10005 US FINE NEEDLE ASPIRATION THYROID MULTI SITE (XPD): ICD-10-PCS | Mod: S$GLB,,, | Performed by: INTERNAL MEDICINE

## 2019-01-04 PROCEDURE — 10006 PR FINE NEEDLE ASP BIOPSY, W/US GUIDANCE, EA ADDTL LESION: ICD-10-PCS | Mod: S$GLB,,, | Performed by: INTERNAL MEDICINE

## 2019-01-04 PROCEDURE — 88173 CYTOPATH EVAL FNA REPORT: CPT | Performed by: PATHOLOGY

## 2019-01-04 PROCEDURE — 10005 FNA BX W/US GDN 1ST LES: CPT | Mod: S$GLB,,, | Performed by: INTERNAL MEDICINE

## 2019-01-04 PROCEDURE — 88173 CYTOLOGY SPECIMEN-FNA NON-RADIOLOGY CLINICIAN PERFORMED W/O ON SITE: ICD-10-PCS | Mod: 26,,, | Performed by: PATHOLOGY

## 2019-01-04 PROCEDURE — 88173 CYTOPATH EVAL FNA REPORT: CPT | Mod: 26,,, | Performed by: PATHOLOGY

## 2019-01-04 PROCEDURE — 84432 ASSAY OF THYROGLOBULIN: CPT | Mod: 91

## 2019-01-08 LAB
BDY SITE: ABNORMAL
BDY SITE: NORMAL
BDY SITE: NORMAL
THYROGLOB LN FNA-MCNC: 1269 NG/ML
THYROGLOB LN FNA-MCNC: <0.1 NG/ML
THYROGLOB LN FNA-MCNC: <0.1 NG/ML

## 2019-01-09 ENCOUNTER — TELEPHONE (OUTPATIENT)
Dept: ENDOCRINOLOGY | Facility: CLINIC | Age: 34
End: 2019-01-09

## 2019-01-09 DIAGNOSIS — C73 THYROID CANCER: Primary | ICD-10-CM

## 2019-01-09 NOTE — TELEPHONE ENCOUNTER
Discussed pathology results and thyroglobulin levels with pt. Will continue close monitoring. Repeat thyroid US, TSH, and thyroglobulin in 6 months.

## 2019-06-19 ENCOUNTER — OFFICE VISIT (OUTPATIENT)
Dept: ENDOCRINOLOGY | Facility: CLINIC | Age: 34
End: 2019-06-19
Payer: COMMERCIAL

## 2019-06-19 VITALS
RESPIRATION RATE: 16 BRPM | BODY MASS INDEX: 32.53 KG/M2 | HEIGHT: 74 IN | DIASTOLIC BLOOD PRESSURE: 79 MMHG | SYSTOLIC BLOOD PRESSURE: 121 MMHG | HEART RATE: 82 BPM | WEIGHT: 253.44 LBS

## 2019-06-19 DIAGNOSIS — E83.51 HYPOCALCEMIA: ICD-10-CM

## 2019-06-19 DIAGNOSIS — C73 THYROID CANCER: Primary | ICD-10-CM

## 2019-06-19 DIAGNOSIS — E89.0 POSTOPERATIVE HYPOTHYROIDISM: ICD-10-CM

## 2019-06-19 PROCEDURE — 99999 PR PBB SHADOW E&M-EST. PATIENT-LVL III: ICD-10-PCS | Mod: PBBFAC,,, | Performed by: INTERNAL MEDICINE

## 2019-06-19 PROCEDURE — 99999 PR PBB SHADOW E&M-EST. PATIENT-LVL III: CPT | Mod: PBBFAC,,, | Performed by: INTERNAL MEDICINE

## 2019-06-19 PROCEDURE — 99214 OFFICE O/P EST MOD 30 MIN: CPT | Mod: S$GLB,,, | Performed by: INTERNAL MEDICINE

## 2019-06-19 PROCEDURE — 99214 PR OFFICE/OUTPT VISIT, EST, LEVL IV, 30-39 MIN: ICD-10-PCS | Mod: S$GLB,,, | Performed by: INTERNAL MEDICINE

## 2019-06-19 NOTE — PROGRESS NOTES
Subjective:      Patient ID: Antoni Felipe is a 33 y.o. male.    Chief Complaint:  Hypothyroidism and Thyroid Cancer      History of Present Illness  Mr. Felipe presents for follow up of thyroid cancer and postoperative hypothyroidism.      Pt is here for management of differentiated thyroid cancer and postsurgical hypothyroidism.  S/p thyroidectomy 6/2015     FINAL PATHOLOGIC DIAGNOSIS  1. TOTAL THYROIDECTOMY SPECIMEN SHOWING 3 FOCI OF PAPILLARY CARCINOMA. 1 IS IN THE  RIGHT LOBE, ONE IN THE ISTHMUS AND ONE IS IN THE LEFT LOBE. SEE SYNOPTIC REPORT:  PROCEDURE: TOTAL THYROIDECTOMY  LYMPH NODE SAMPLING: CENTRAL NECK DISSECTION  TUMOR FOCALITY: MULTIFOCAL (3)  TUMOR LATERALITY: RIGHT LOBE, LEFT LOBE AND ISTHMUS  TUMOR SIZE: 1.1 CM  HISTOLOGIC TYPE: PAPILLARY CARCINOMA, CLASSICAL  MARGINS: UNINVOLVED BY PAPILLARY CARCINOMA. THE TUMOR APPROACHES BUT DOES NOT  INVOLVE THE OVERLYING INKED SURFACE IN BOTH THE RIGHT LOBE AND ISTHMUS.  ANGIOINVASION: NOT IDENTIFIED  LYMPHATIC INVASION: NOT IDENTIFIED  EXTRATHYROIDAL EXTENSION: NOT IDENTIFIED  TUMOR STAGE: pT1 b N1a  2. SPECIMEN LABELED CENTRAL NECK CONTENTS SHOWS 2 OUT OF 3 (2/3) LYMPH NODES WITH  METASTATIC PAPILLARY CARCINOMA.     No family history of thyroid cancer.  No history of head or neck irradiation.     Still with mild hypocalcemia.  PTH is detectable but inappropriately in the low normal range  Gets occasional numbness and tingling on the nasal bridge every 3-4 months, no perioral numbness/tingling or muscle cramping  Currently he takes 2000 mg of calcium carbonate at bedtime. He rarely has symptoms of hypocalcemia on this regimen.      No neck pain or dysphagia.     Currently taking 200 mcg daily with additional half tab on Sunday. Takes thyroid hormone on empty stomach and separates from other foods and meds. Will occasionally miss the half tablet on Sunday.      Has always felt sluggish since surgery. Weight fluctuates. Regular BM's. No heat or cold  intolerance.  No heart palpitations, tremors or increased anxiousness.      Results for [address] (MRN [id number]) as of 6/19/2019 13:08   Ref. Range 12/11/2015 08:24 2/22/2016 08:50 4/23/2016 08:30 5/18/2017 17:01 5/15/2018 15:51 7/28/2018 08:14 8/27/2018 07:47 11/7/2018 08:26   Thyroglobulin Antibody Screen Latest Ref Range: <4.0 IU/mL <1.8  <1.8 <1.8 <1.8   <1.8   Thyroglobulin, Tumor Marker Latest Units: ng/mL 0.9 (H)  0.7 (H) 1.0 (H) 1.9 (H)   0.9 (H)       Thyroid US (9/6/18)     Impression:  Structure consistent with lymph node in L thyroid bed with higher risk characteristics (height/width ratio >0.7 in AP view, lack of hilum).  There is another lymph node in L level IIA without clear hilum.    FNA Left level 2A LN, and left level VI lymph node (likely residual thyroid tissue):  FINAL PATHOLOGIC DIAGNOSIS  1. FINE-NEEDLE ASPIRATE OF LEFT LEVEL 6 LYMPH NODE:  A SMALL NUMBER OF CLUSTERS OF CELLS ARE PRESENT CONSISTENT WITH FOLLICULAR CELLS FROM  THE THYROID. THE CELLS ARE NOT OBVIOUSLY MALIGNANT, HOWEVER. THEY ARE IN CONJUNCTION  WITH FLUID WHICH IS CONSISTENT WITH COLLOID. SUCH AN ASPIRATE COULD COME FROM  INNOCUOUS THYROID TISSUE. ON THE OTHER HAND, IF THIS SPECIMEN DEFINITELY COMES FROM A  LYMPH NODE, THEN IT LIKELY REFLECTS METASTATIC CARCINOMA OF THYROID ORIGIN. THE  SPECIMEN DOES NOT HOWEVER HAVE ABUNDANT LYMPHOCYTES INDICATING THAT A LYMPH NODE  WAS SAMPLED.  2. FINE-NEEDLE ASPIRATE OF LEFT LEVEL 2A LYMPH NODE:  NO CARCINOMA IDENTIFIED  ABUNDANT LYMPHOCYTES INDICATE THAT A LYMPH NODE WAS SAMPLED    Review of Systems   Constitutional: Negative for chills and fever.   Gastrointestinal: Negative for nausea and vomiting.       Objective:   Physical Exam   Nursing note and vitals reviewed.  No thyroid tissue palpated  No cervical adenopathy  DTR's 2 +  No tremor    Lab Review:   Results for [address] (MRN [id number]) as of 6/19/2019 07:38   Ref. Range 1/4/2019 16:42 1/4/2019 16:42    Thyroglobulin, FNA, Lymph Node Latest Ref Range: <=1.0 ng/mL <0.1 1269 (H)   Site, FNA Unknown left neck level 2A lymph node Left neck level 6 lymph node     Results for [address] (MRN [id number]) as of 6/19/2019 07:38   Ref. Range 11/7/2018 08:26   Sodium Latest Ref Range: 136 - 145 mmol/L 140   Potassium Latest Ref Range: 3.5 - 5.1 mmol/L 4.6   Chloride Latest Ref Range: 95 - 110 mmol/L 103   CO2 Latest Ref Range: 23 - 29 mmol/L 30 (H)   Anion Gap Latest Ref Range: 8 - 16 mmol/L 7 (L)   BUN, Bld Latest Ref Range: 6 - 20 mg/dL 16   Creatinine Latest Ref Range: 0.5 - 1.4 mg/dL 0.9   eGFR if non African American Latest Ref Range: >60 mL/min/1.73 m^2 >60.0   eGFR if African American Latest Ref Range: >60 mL/min/1.73 m^2 >60.0   Glucose Latest Ref Range: 70 - 110 mg/dL 94   Calcium Latest Ref Range: 8.7 - 10.5 mg/dL 8.5 (L)   Phosphorus Latest Ref Range: 2.7 - 4.5 mg/dL 4.1   Albumin Latest Ref Range: 3.5 - 5.2 g/dL 4.2   TSH Latest Ref Range: 0.400 - 4.000 uIU/mL 0.304 (L)   Free T4 Latest Ref Range: 0.71 - 1.51 ng/dL 1.51   Thyroglobulin Interpretation Unknown SEE BELOW   Thyroglobulin Antibody Screen Latest Ref Range: <4.0 IU/mL <1.8   Thyroglobulin, Tumor Marker Latest Units: ng/mL 0.9 (H)       Assessment:     1. Thyroid cancer    2. Hypocalcemia    3. Postoperative hypothyroidism      Plan:     1.) Patient with papillary thyroid cancer stage 1, YESI low risk for recurrence  --S/p total thyroidectomy in 2015 with 2 of 3 CN nodes positive for mets  --Was decided not to give him COSTA up front due to YESI low risk category  --Had stable low level Tg consistent with remnant that has been relatively stable  --Only ultrasound done since surgery showed a small area in left level VI that underwent FNA and was most consistent with benign thyroid tissue  --Had FNA of left level II lymph node that was benign  --Will aim to keep TSH moderately suppressed  --He is very interested in COSTA ablation at this point  --Will  repeat Tg, ultrasound and TSH now  --Will decide on COSTA ablation once repeat studies done     2. Mild postsurgical hypoparathyroidism  --To continue calcium carbonate 2 gram daily     3.) Biochemically and clinically euthyroid but aiming for TSH suppression  --Continue levothyroxine 200 mcg Mon-Sat with an additional half tablet on Sunday only  --Repeat TSH now     RTC in 6 months     Lázaro Leslie M.D. Staff Endocrinology

## 2019-06-21 ENCOUNTER — LAB VISIT (OUTPATIENT)
Dept: LAB | Facility: HOSPITAL | Age: 34
End: 2019-06-21
Payer: COMMERCIAL

## 2019-06-21 DIAGNOSIS — C73 THYROID CANCER: ICD-10-CM

## 2019-06-21 LAB
T4 FREE SERPL-MCNC: 1.6 NG/DL (ref 0.71–1.51)
TSH SERPL DL<=0.005 MIU/L-ACNC: 0.03 UIU/ML (ref 0.4–4)

## 2019-06-21 PROCEDURE — 84443 ASSAY THYROID STIM HORMONE: CPT

## 2019-06-21 PROCEDURE — 84439 ASSAY OF FREE THYROXINE: CPT

## 2019-06-21 PROCEDURE — 36415 COLL VENOUS BLD VENIPUNCTURE: CPT

## 2019-06-21 PROCEDURE — 86800 THYROGLOBULIN ANTIBODY: CPT

## 2019-06-24 ENCOUNTER — HOSPITAL ENCOUNTER (OUTPATIENT)
Dept: RADIOLOGY | Facility: HOSPITAL | Age: 34
Discharge: HOME | End: 2019-06-24
Attending: STUDENT IN AN ORGANIZED HEALTH CARE EDUCATION/TRAINING PROGRAM
Payer: COMMERCIAL

## 2019-06-24 DIAGNOSIS — C73 THYROID CANCER: ICD-10-CM

## 2019-06-24 PROCEDURE — 76536 US EXAM OF HEAD AND NECK: CPT | Mod: 26,,, | Performed by: INTERNAL MEDICINE

## 2019-06-24 PROCEDURE — 76536 US SOFT TISSUE HEAD NECK THYROID: ICD-10-PCS | Mod: 26,,, | Performed by: INTERNAL MEDICINE

## 2019-06-24 PROCEDURE — 76536 US EXAM OF HEAD AND NECK: CPT | Mod: TC

## 2019-06-25 ENCOUNTER — PATIENT MESSAGE (OUTPATIENT)
Dept: ENDOCRINOLOGY | Facility: CLINIC | Age: 34
End: 2019-06-25

## 2019-07-03 ENCOUNTER — TELEPHONE (OUTPATIENT)
Dept: ENDOCRINOLOGY | Facility: CLINIC | Age: 34
End: 2019-07-03

## 2019-07-03 DIAGNOSIS — C73 THYROID CANCER: Primary | ICD-10-CM

## 2019-07-03 NOTE — TELEPHONE ENCOUNTER
Discussed case with nuclear medicine. Will have him do low iodine diet then treat with 150 mCi of radioactive iodine for thyroid cancer.

## 2019-07-09 ENCOUNTER — TELEPHONE (OUTPATIENT)
Dept: ENDOCRINOLOGY | Facility: CLINIC | Age: 34
End: 2019-07-09

## 2019-07-09 NOTE — TELEPHONE ENCOUNTER
----- Message from Lexie Benson MA sent at 7/9/2019  2:37 PM CDT -----  Needs thyorgen x 2, 150mci , nuclar med apt scheduled can you do the thyrogen visit it won;t let me thanks  ----- Message -----  From: Arianne Yeh  Sent: 7/9/2019   1:55 PM  To: Lexie Benson MA    Appointment is scheduled, please contact patient.  ----- Message -----  From: Lexie Benson MA  Sent: 7/3/2019   3:48 PM  To: Arianne Yeh     radioactive iodine and whole body scan per dr ander hearn available thanks

## 2019-07-10 RX ORDER — TRAZODONE HYDROCHLORIDE 50 MG/1
TABLET ORAL
Qty: 60 TABLET | Refills: 5 | Status: SHIPPED | OUTPATIENT
Start: 2019-07-10 | End: 2020-01-20 | Stop reason: SDUPTHER

## 2019-07-22 DIAGNOSIS — C73 THYROID CANCER: Primary | ICD-10-CM

## 2019-07-22 RX ORDER — LEVOTHYROXINE SODIUM 200 UG/1
TABLET ORAL
Qty: 90 TABLET | Refills: 3 | Status: SHIPPED | OUTPATIENT
Start: 2019-07-22 | End: 2020-03-05 | Stop reason: SDUPTHER

## 2019-07-24 ENCOUNTER — PATIENT MESSAGE (OUTPATIENT)
Dept: ENDOCRINOLOGY | Facility: CLINIC | Age: 34
End: 2019-07-24

## 2019-07-25 ENCOUNTER — PATIENT MESSAGE (OUTPATIENT)
Dept: ENDOCRINOLOGY | Facility: CLINIC | Age: 34
End: 2019-07-25

## 2019-07-29 ENCOUNTER — CLINICAL SUPPORT (OUTPATIENT)
Dept: ENDOCRINOLOGY | Facility: CLINIC | Age: 34
End: 2019-07-29
Payer: COMMERCIAL

## 2019-07-29 DIAGNOSIS — C73 THYROID CANCER: Primary | ICD-10-CM

## 2019-07-29 PROCEDURE — 99999 PR PBB SHADOW E&M-EST. PATIENT-LVL I: CPT | Mod: PBBFAC,,,

## 2019-07-29 PROCEDURE — 96372 THER/PROPH/DIAG INJ SC/IM: CPT | Mod: S$GLB,,, | Performed by: INTERNAL MEDICINE

## 2019-07-29 PROCEDURE — 96372 PR INJECTION,THERAP/PROPH/DIAG2ST, IM OR SUBCUT: ICD-10-PCS | Mod: S$GLB,,, | Performed by: INTERNAL MEDICINE

## 2019-07-29 PROCEDURE — 99999 PR PBB SHADOW E&M-EST. PATIENT-LVL I: ICD-10-PCS | Mod: PBBFAC,,,

## 2019-07-29 NOTE — PROGRESS NOTES
.Patient arrived to clinic accompany by wife. AAOX3. Explained injection to patient, answer all pt question regarding injection. Informed patient of side effect nausea,headache,fatigue,vomiting,dizziness,weakness,temporary flu-like symptoms (fever, chills, shivering, muscle or joint pain), pain to injection site treat w/OTC pain relievers. Administer injection to patient in right dorsogluteal. Advise patient to sit in lobby for 10-15 min in case of any sudden side effect.

## 2019-07-30 ENCOUNTER — CLINICAL SUPPORT (OUTPATIENT)
Dept: ENDOCRINOLOGY | Facility: CLINIC | Age: 34
End: 2019-07-30
Payer: COMMERCIAL

## 2019-07-30 PROCEDURE — 96372 THER/PROPH/DIAG INJ SC/IM: CPT | Mod: S$GLB,,, | Performed by: INTERNAL MEDICINE

## 2019-07-30 PROCEDURE — 96372 PR INJECTION,THERAP/PROPH/DIAG2ST, IM OR SUBCUT: ICD-10-PCS | Mod: S$GLB,,, | Performed by: INTERNAL MEDICINE

## 2019-07-30 NOTE — PROGRESS NOTES
.Patient arrived to clinic accompany by wife . AAOX3. Patient denies any complications  from yesterday injection. Administered injection Im L Dorsogluteal. Advise patient to set in lobby for 10-15 min

## 2019-07-31 ENCOUNTER — HOSPITAL ENCOUNTER (OUTPATIENT)
Dept: RADIOLOGY | Facility: HOSPITAL | Age: 34
Discharge: HOME | End: 2019-07-31
Attending: INTERNAL MEDICINE
Payer: COMMERCIAL

## 2019-07-31 DIAGNOSIS — C73 THYROID CANCER: ICD-10-CM

## 2019-07-31 PROCEDURE — 79005 NM THERAPY BY ORAL ADMINISTRATION: ICD-10-PCS | Mod: 26,,, | Performed by: RADIOLOGY

## 2019-07-31 PROCEDURE — 79005 NUCLEAR RX ORAL ADMIN: CPT | Mod: 26,,, | Performed by: RADIOLOGY

## 2019-07-31 PROCEDURE — A9517 I131 IODIDE CAP, RX: HCPCS | Mod: JG

## 2019-07-31 RX ORDER — ONDANSETRON 4 MG/1
4 TABLET, ORALLY DISINTEGRATING ORAL EVERY 8 HOURS PRN
Qty: 10 TABLET | Refills: 0 | Status: SHIPPED | OUTPATIENT
Start: 2019-07-31 | End: 2020-03-03

## 2019-08-01 ENCOUNTER — TELEPHONE (OUTPATIENT)
Dept: ENDOCRINOLOGY | Facility: CLINIC | Age: 34
End: 2019-08-01

## 2019-08-01 DIAGNOSIS — C73 THYROID CANCER: Primary | ICD-10-CM

## 2019-08-01 DIAGNOSIS — E89.0 POSTOPERATIVE HYPOTHYROIDISM: ICD-10-CM

## 2019-08-01 NOTE — TELEPHONE ENCOUNTER
S/p 156 mCi of radioactive iodine for thyroid cancer. Needs TSH, thyroglobulin, renal panel, vitamin D and neck ultrasound in 6 months with follow up at that time.

## 2019-08-02 ENCOUNTER — TELEPHONE (OUTPATIENT)
Dept: ENDOCRINOLOGY | Facility: CLINIC | Age: 34
End: 2019-08-02

## 2019-08-02 NOTE — TELEPHONE ENCOUNTER
----- Message from Arianne Yeh sent at 8/2/2019 10:33 AM CDT -----  He's scheduled for he's scan next week from post pill on Wednesday of this week.  150 mCi was administered, is this still something that has to be scheduled?    Thanks,  Arianne  ----- Message -----  From: Lexie Benson MA  Sent: 8/1/2019   2:49 PM  To: Arianne Yeh    S/p 156 mCi of radioactive iodine for thyroid cancer per dr ander mcnair available

## 2019-08-06 ENCOUNTER — HOSPITAL ENCOUNTER (OUTPATIENT)
Dept: RADIOLOGY | Facility: HOSPITAL | Age: 34
Discharge: HOME | End: 2019-08-06
Attending: INTERNAL MEDICINE
Payer: COMMERCIAL

## 2019-08-06 DIAGNOSIS — C73 THYROID CANCER: ICD-10-CM

## 2019-08-06 PROCEDURE — 78018 THYROID MET IMAGING BODY: CPT | Mod: TC

## 2019-08-06 PROCEDURE — 78018 NM THYROID METASTATIC CANCER WHOLE BODY SCAN: ICD-10-PCS | Mod: 26,,, | Performed by: RADIOLOGY

## 2019-08-06 PROCEDURE — 78018 THYROID MET IMAGING BODY: CPT | Mod: 26,,, | Performed by: RADIOLOGY

## 2019-08-07 ENCOUNTER — PATIENT MESSAGE (OUTPATIENT)
Dept: ENDOCRINOLOGY | Facility: CLINIC | Age: 34
End: 2019-08-07

## 2019-08-07 DIAGNOSIS — E89.0 POSTOPERATIVE HYPOTHYROIDISM: Primary | ICD-10-CM

## 2019-08-07 DIAGNOSIS — C73 THYROID CANCER: ICD-10-CM

## 2020-01-10 ENCOUNTER — TELEPHONE (OUTPATIENT)
Dept: INTERNAL MEDICINE | Facility: CLINIC | Age: 35
End: 2020-01-10

## 2020-01-13 NOTE — PROGRESS NOTES
Refill Authorization Note     is requesting a refill authorization.    Brief assessment and rationale for refill: REVIEW: re-establish care           Medication Therapy Plan: pt hasn't had appt since 5/18, needs to re-establish care; REVIEW     Medication reconciliation completed: No                         Comments:   Requested Prescriptions   Pending Prescriptions Disp Refills    traZODone (DESYREL) 50 MG tablet [Pharmacy Med Name: traZODone HCl 50 MG Oral Tablet] 180 tablet 0     Sig: TAKE 1 TO 2 TABLETS BY MOUTH NIGHTLY AS NEEDED FOR INSOMNIA       Psychiatry: Antidepressants - Serotonin Modulator Failed - 1/10/2020  1:24 PM        Failed - Office visit in past 6 months or future 90 days     Recent Outpatient Visits            6 months ago Thyroid cancer    [facility] - Endocrinology [facility] Lázaro Leslie MD    1 year ago Hypocalcemia    [facility] - Endocrinology [facility] Lázrao Leslie MD    1 year ago Thyroid cancer    [facility] - Endo/Diab/Metab Lázaro Leslie MD    1 year ago Annual physical exam    [facility] - Internal Medicine Devin Chacon MD    2 years ago Upper respiratory tract infection, unspecified type    [facility] - Internal Medicine Chela Finnegan MD                    Passed - Patient is at least 20 years old

## 2020-01-13 NOTE — TELEPHONE ENCOUNTER
Please see the following assessment. This refill request still requires some action on your part. Patient's last OV with you was in 5/17. Patient is due for annual. Defer to you. Will follow up with your staff to schedule appointment after your decision.    Thank you.

## 2020-01-17 RX ORDER — TRAZODONE HYDROCHLORIDE 50 MG/1
TABLET ORAL
Qty: 180 TABLET | Refills: 0 | OUTPATIENT
Start: 2020-01-17

## 2020-01-20 ENCOUNTER — PATIENT MESSAGE (OUTPATIENT)
Dept: INTERNAL MEDICINE | Facility: CLINIC | Age: 35
End: 2020-01-20

## 2020-01-21 ENCOUNTER — PATIENT MESSAGE (OUTPATIENT)
Dept: INTERNAL MEDICINE | Facility: CLINIC | Age: 35
End: 2020-01-21

## 2020-01-21 ENCOUNTER — TELEPHONE (OUTPATIENT)
Dept: INTERNAL MEDICINE | Facility: CLINIC | Age: 35
End: 2020-01-21

## 2020-01-21 RX ORDER — TRAZODONE HYDROCHLORIDE 50 MG/1
50 TABLET ORAL NIGHTLY PRN
Qty: 60 TABLET | Refills: 0 | Status: SHIPPED | OUTPATIENT
Start: 2020-01-21 | End: 2020-01-21

## 2020-01-21 RX ORDER — TRAZODONE HYDROCHLORIDE 50 MG/1
50 TABLET ORAL NIGHTLY PRN
Qty: 180 TABLET | Refills: 0 | Status: SHIPPED | OUTPATIENT
Start: 2020-01-21 | End: 2020-01-21

## 2020-01-21 RX ORDER — TRAZODONE HYDROCHLORIDE 50 MG/1
50-100 TABLET ORAL NIGHTLY PRN
Qty: 60 TABLET | Refills: 0 | Status: SHIPPED | OUTPATIENT
Start: 2020-01-21 | End: 2020-03-03 | Stop reason: SDUPTHER

## 2020-01-21 NOTE — TELEPHONE ENCOUNTER
----- Message from Claire Ramon sent at 1/21/2020 10:46 AM CST -----  Contact: Jennifer [phone]  Type: Rx Clarification/ Additional Information/ Questions    Medication:traZODone (DESYREL) 50 MG tablet     What questions do you have about the medication, if any? Correct direction    Pharmacy Contact Name:[id number] - [place], [place] - [address]   [phone] (Phone)  [phone] (Fax)    Comments:please advise, thanks

## 2020-01-21 NOTE — TELEPHONE ENCOUNTER
"pharmacy needs clarification on directions for medication. RX has 2 different directions. Please clarify which one is correct.     Directions that read on RX:  "Take 1 tablet (50 mg total) by mouth nightly as needed for Insomnia. TAKE 1 TO 2 TABLETS BY MOUTH NIGHTLY AS NEEDED FOR INSOMNIA - Oral"  "

## 2020-01-21 NOTE — PROGRESS NOTES
Refill Authorization Note     is requesting a refill authorization.    Brief assessment and rationale for refill: APPROVE: prr           Medication Therapy Plan: pt needs office visit; FOVS(3/20); approve 3 more     Medication reconciliation completed: No                         Comments:   Requested Prescriptions   Pending Prescriptions Disp Refills    traZODone (DESYREL) 50 MG tablet 180 tablet 0     Sig: Take 1 tablet (50 mg total) by mouth nightly as needed for Insomnia. TAKE 1 TO 2 TABLETS BY MOUTH NIGHTLY AS NEEDED FOR INSOMNIA       Psychiatry: Antidepressants - Serotonin Modulator Failed - 1/20/2020  4:43 PM        Failed - Office visit in past 6 months or future 90 days     Recent Outpatient Visits            7 months ago Thyroid cancer    [facility] - Endocrinology [facility] Lázaro Leslie MD    1 year ago Hypocalcemia    [facility] - Endocrinology [facility] Lázaro Leslie MD    1 year ago Thyroid cancer    [facility]Diab/Metab Lázaro Leslie MD    1 year ago Annual physical exam    [facility] Devin Chacon MD    2 years ago Upper respiratory tract infection, unspecified type    [facility] Chela Finnegan MD          Future Appointments              In 1 month MD [facility] D[facility] - Internal Medicine, [facility]                Passed - Patient is at least 20 years old

## 2020-01-22 NOTE — TELEPHONE ENCOUNTER
Requested medication refilled and sent by electronic prescription with an accurate sig. Please call and notify the patient. Thank you.

## 2020-01-27 ENCOUNTER — TELEPHONE (OUTPATIENT)
Dept: INTERNAL MEDICINE | Facility: CLINIC | Age: 35
End: 2020-01-27

## 2020-01-27 NOTE — TELEPHONE ENCOUNTER
----- Message from Delmy Brown sent at 1/27/2020 11:54 AM CST -----  Contact: Jennifer Freedman  [phone]  Pharmacy is calling to clarify an RX.  RX name:  traZODone (DESYREL) 50 MG tablet  What do they need to clarify:  Need to verify directions  Comments:

## 2020-03-03 ENCOUNTER — OFFICE VISIT (OUTPATIENT)
Dept: INTERNAL MEDICINE | Facility: CLINIC | Age: 35
End: 2020-03-03
Attending: FAMILY MEDICINE
Payer: COMMERCIAL

## 2020-03-03 ENCOUNTER — LAB VISIT (OUTPATIENT)
Dept: LAB | Facility: HOSPITAL | Age: 35
End: 2020-03-03
Attending: FAMILY MEDICINE
Payer: COMMERCIAL

## 2020-03-03 ENCOUNTER — PATIENT MESSAGE (OUTPATIENT)
Dept: ENDOCRINOLOGY | Facility: CLINIC | Age: 35
End: 2020-03-03

## 2020-03-03 VITALS
TEMPERATURE: 98 F | OXYGEN SATURATION: 98 % | HEART RATE: 68 BPM | HEIGHT: 74 IN | WEIGHT: 257.25 LBS | BODY MASS INDEX: 33.02 KG/M2 | DIASTOLIC BLOOD PRESSURE: 74 MMHG | SYSTOLIC BLOOD PRESSURE: 130 MMHG

## 2020-03-03 DIAGNOSIS — C73 THYROID CANCER: ICD-10-CM

## 2020-03-03 DIAGNOSIS — E89.0 POSTOPERATIVE HYPOTHYROIDISM: ICD-10-CM

## 2020-03-03 DIAGNOSIS — G47.00 INSOMNIA, UNSPECIFIED TYPE: ICD-10-CM

## 2020-03-03 DIAGNOSIS — Z00.00 ANNUAL PHYSICAL EXAM: ICD-10-CM

## 2020-03-03 DIAGNOSIS — Z00.00 ANNUAL PHYSICAL EXAM: Primary | ICD-10-CM

## 2020-03-03 PROCEDURE — 36415 COLL VENOUS BLD VENIPUNCTURE: CPT

## 2020-03-03 PROCEDURE — 80048 BASIC METABOLIC PNL TOTAL CA: CPT

## 2020-03-03 PROCEDURE — 99395 PREV VISIT EST AGE 18-39: CPT | Mod: S$GLB,,, | Performed by: FAMILY MEDICINE

## 2020-03-03 PROCEDURE — 80061 LIPID PANEL: CPT

## 2020-03-03 PROCEDURE — 99999 PR PBB SHADOW E&M-EST. PATIENT-LVL IV: ICD-10-PCS | Mod: PBBFAC,,, | Performed by: FAMILY MEDICINE

## 2020-03-03 PROCEDURE — 99395 PR PREVENTIVE VISIT,EST,18-39: ICD-10-PCS | Mod: S$GLB,,, | Performed by: FAMILY MEDICINE

## 2020-03-03 PROCEDURE — 99999 PR PBB SHADOW E&M-EST. PATIENT-LVL IV: CPT | Mod: PBBFAC,,, | Performed by: FAMILY MEDICINE

## 2020-03-03 RX ORDER — TRAZODONE HYDROCHLORIDE 50 MG/1
50-100 TABLET ORAL NIGHTLY PRN
Qty: 180 TABLET | Refills: 3 | Status: SHIPPED | OUTPATIENT
Start: 2020-03-03 | End: 2021-02-02

## 2020-03-03 NOTE — PROGRESS NOTES
Subjective:       Patient ID: Antoni Felipe is a 34 y.o. male.    Chief Complaint: Annual Exam    Established patient for an annual wellness check/physical exam and also chronic disease management. Specific complaints - see dictation and please see ROS.  P, S, Fm, Soc Hx's; Meds, allergies reviewed and reconciled.  Health maintenance file reviewed and addressed items due.        Review of Systems   Constitutional: Negative for activity change, chills, fatigue, fever and unexpected weight change.   HENT: Negative for congestion, hearing loss, rhinorrhea and trouble swallowing.    Eyes: Negative for discharge, redness and visual disturbance.   Respiratory: Negative for cough, chest tightness, shortness of breath and wheezing.    Cardiovascular: Negative for chest pain, palpitations and leg swelling.   Gastrointestinal: Negative for abdominal pain, blood in stool, constipation, diarrhea and vomiting.   Endocrine: Negative for polydipsia and polyuria.   Genitourinary: Negative for difficulty urinating, hematuria and urgency.   Musculoskeletal: Positive for arthralgias. Negative for back pain, gait problem, joint swelling, myalgias and neck pain.   Skin: Negative for color change and rash.   Neurological: Negative for tremors, speech difficulty, weakness, numbness and headaches.   Hematological: Negative for adenopathy. Does not bruise/bleed easily.   Psychiatric/Behavioral: Positive for sleep disturbance. Negative for behavioral problems, confusion and dysphoric mood. The patient is not nervous/anxious.        Objective:      Physical Exam   Constitutional: He appears well-developed and well-nourished.   HENT:   Head: Normocephalic.   Right Ear: Tympanic membrane, external ear and ear canal normal.   Left Ear: Tympanic membrane, external ear and ear canal normal.   Mouth/Throat: Oropharynx is clear and moist.   Eyes: Pupils are equal, round, and reactive to light.   Neck: Normal range of motion. Neck supple.  Carotid bruit is not present. No thyromegaly present.   Cardiovascular: Normal rate, regular rhythm, normal heart sounds and intact distal pulses. Exam reveals no gallop and no friction rub.   No murmur heard.  Pulmonary/Chest: Effort normal and breath sounds normal.   Abdominal: Soft. He exhibits no distension and no mass. There is no tenderness. There is no rebound and no guarding.   Musculoskeletal: Normal range of motion. He exhibits no edema or tenderness.   Lymphadenopathy:     He has no cervical adenopathy.   Neurological: He is alert. He has normal strength. He displays normal reflexes. No cranial nerve deficit or sensory deficit. Coordination and gait normal.   Skin: Skin is warm and dry.   Psychiatric: He has a normal mood and affect. His behavior is normal. Judgment and thought content normal.   Nursing note and vitals reviewed.      Assessment:       1. Annual physical exam    2. Thyroid cancer    3. Postoperative hypothyroidism    4. Insomnia, unspecified type        Plan:     Medication List with Changes/Refills   Current Medications    CALCIUM CARBONATE 400 MG (1,000 MG) CHEW    Take 1 tablet (400 mg total) by mouth 3 (three) times daily. Week 1 take 1 tablet three times a day  Week 2 take 1 tablets two times a day  Take extra if symptoms of hypocalcemia    LEVOTHYROXINE (SYNTHROID) 200 MCG TABLET    TAKE 1 TABLET BY MOUTH ONCE DAILY   Changed and/or Refilled Medications    Modified Medication Previous Medication    TRAZODONE (DESYREL) 50 MG TABLET traZODone (DESYREL) 50 MG tablet       Take 1-2 tablets ( mg total) by mouth nightly as needed for Insomnia.    Take 1-2 tablets ( mg total) by mouth nightly as needed for Insomnia.   Discontinued Medications    ONDANSETRON (ZOFRAN-ODT) 4 MG TBDL    Take 1 tablet (4 mg total) by mouth every 8 (eight) hours as needed.     Antoni was seen today for annual exam.    Diagnoses and all orders for this visit:    Annual physical exam  -     Basic  metabolic panel; Future  -     Lipid panel; Future    Thyroid cancer    Postoperative hypothyroidism    Insomnia, unspecified type    Other orders  -     traZODone (DESYREL) 50 MG tablet; Take 1-2 tablets ( mg total) by mouth nightly as needed for Insomnia.      See meds, orders, follow up, routing and instructions sections of encounter and AVS. Discussed with patient and provided on AVS.    Some fatigue.  No erection problems. I did not feel a testosterone screen was indicated.  He had 1 in the past, normal.  He is due for a follow-up with Dr. Leslie.  Thyroid surgery 2015.  Radioactive iodine September last year.

## 2020-03-03 NOTE — PATIENT INSTRUCTIONS
Schedule lab orders for today.     [phone] - [facility].  Clinical  or Psychologist      Information about cholesterol, high blood pressure and healthy diet and activity recommendations can be found at the following links on the Internet:    http://www.nhlbi.nih.gov/health/health-topics/topics/hbc  http://www.nhlbi.nih.gov/health/educational/lose_wt/index.htm  Http://www.nhlbi.nih.gov/files/docs/public/heart/hbp_low.pdf  http://www.heart.org/HEARTORG/  http://diabetes.org/  https://www.cdc.gov/  Https://healthfinder.gov/  https://health.gov/dietaryguidelines/2015/guidelines/  https://health.gov/paguidelines/second-edition/pdf/Physical_Activity_Guidelines_2nd_edition.pdf

## 2020-03-04 LAB
ANION GAP SERPL CALC-SCNC: 12 MMOL/L (ref 8–16)
BUN SERPL-MCNC: 19 MG/DL (ref 6–20)
CALCIUM SERPL-MCNC: 8.4 MG/DL (ref 8.7–10.5)
CHLORIDE SERPL-SCNC: 99 MMOL/L (ref 95–110)
CHOLEST SERPL-MCNC: 204 MG/DL (ref 120–199)
CHOLEST/HDLC SERPL: 4.3 {RATIO} (ref 2–5)
CO2 SERPL-SCNC: 27 MMOL/L (ref 23–29)
CREAT SERPL-MCNC: 1.1 MG/DL (ref 0.5–1.4)
EST. GFR  (AFRICAN AMERICAN): >60 ML/MIN/1.73 M^2
EST. GFR  (NON AFRICAN AMERICAN): >60 ML/MIN/1.73 M^2
GLUCOSE SERPL-MCNC: 89 MG/DL (ref 70–110)
HDLC SERPL-MCNC: 48 MG/DL (ref 40–75)
HDLC SERPL: 23.5 % (ref 20–50)
LDLC SERPL CALC-MCNC: 129.4 MG/DL (ref 63–159)
NONHDLC SERPL-MCNC: 156 MG/DL
POTASSIUM SERPL-SCNC: 4.1 MMOL/L (ref 3.5–5.1)
SODIUM SERPL-SCNC: 138 MMOL/L (ref 136–145)
TRIGL SERPL-MCNC: 133 MG/DL (ref 30–150)

## 2020-03-05 DIAGNOSIS — C73 THYROID CANCER: ICD-10-CM

## 2020-03-05 RX ORDER — LEVOTHYROXINE SODIUM 200 UG/1
200 TABLET ORAL DAILY
Qty: 90 TABLET | Refills: 3 | Status: SHIPPED | OUTPATIENT
Start: 2020-03-05 | End: 2021-02-01

## 2020-03-05 NOTE — TELEPHONE ENCOUNTER
----- Message from Diane Pierce sent at 3/5/2020  2:10 PM CST -----  Contact: joe [facility]  [facility] and they are requesting a new prescription for levothyroxine (SYNTHROID) 200 MCG tablet. Please give Bibi a call back at [phone] option 3 for providers.    Fax [phone]

## 2020-04-06 ENCOUNTER — PATIENT MESSAGE (OUTPATIENT)
Dept: ENDOCRINOLOGY | Facility: CLINIC | Age: 35
End: 2020-04-06

## 2020-04-11 ENCOUNTER — TELEPHONE (OUTPATIENT)
Dept: INTERNAL MEDICINE | Facility: CLINIC | Age: 35
End: 2020-04-11

## 2020-04-11 NOTE — TELEPHONE ENCOUNTER
Called patient and discussed labs and or test results. Patient expressed understanding and had the opportunity to ask questions. Any questions were answered. See meds, orders, follow up and instructions sections of encounter.    Specific issues include:  Sl low Ca - hx throidectomy, followed by teresita, states he takes Ca suppl and has briana in July.

## 2020-07-08 ENCOUNTER — HOSPITAL ENCOUNTER (OUTPATIENT)
Dept: ENDOCRINOLOGY | Facility: CLINIC | Age: 35
Discharge: HOME | End: 2020-07-08
Attending: INTERNAL MEDICINE
Payer: COMMERCIAL

## 2020-07-08 DIAGNOSIS — C73 THYROID CANCER: ICD-10-CM

## 2020-07-08 DIAGNOSIS — E89.0 POSTOPERATIVE HYPOTHYROIDISM: ICD-10-CM

## 2020-07-08 PROCEDURE — 76536 US SOFT TISSUE HEAD NECK THYROID: ICD-10-PCS | Mod: S$GLB,,, | Performed by: INTERNAL MEDICINE

## 2020-07-08 PROCEDURE — 76536 US EXAM OF HEAD AND NECK: CPT | Mod: S$GLB,,, | Performed by: INTERNAL MEDICINE

## 2020-07-15 NOTE — PROGRESS NOTES
Left message for patient to confirm there appointment , and offered Virtual Video appointment on the voice mail.

## 2020-07-16 ENCOUNTER — OFFICE VISIT (OUTPATIENT)
Dept: ENDOCRINOLOGY | Facility: CLINIC | Age: 35
End: 2020-07-16
Payer: COMMERCIAL

## 2020-07-16 VITALS
SYSTOLIC BLOOD PRESSURE: 124 MMHG | HEIGHT: 74 IN | BODY MASS INDEX: 30.81 KG/M2 | DIASTOLIC BLOOD PRESSURE: 78 MMHG | HEART RATE: 71 BPM | WEIGHT: 240.06 LBS

## 2020-07-16 DIAGNOSIS — E89.0 POSTOPERATIVE HYPOTHYROIDISM: ICD-10-CM

## 2020-07-16 DIAGNOSIS — E83.51 HYPOCALCEMIA: ICD-10-CM

## 2020-07-16 DIAGNOSIS — C73 THYROID CANCER: Primary | ICD-10-CM

## 2020-07-16 PROCEDURE — 99999 PR PBB SHADOW E&M-EST. PATIENT-LVL III: CPT | Mod: PBBFAC,,, | Performed by: INTERNAL MEDICINE

## 2020-07-16 PROCEDURE — 99214 PR OFFICE/OUTPT VISIT, EST, LEVL IV, 30-39 MIN: ICD-10-PCS | Mod: S$GLB,,, | Performed by: INTERNAL MEDICINE

## 2020-07-16 PROCEDURE — 99999 PR PBB SHADOW E&M-EST. PATIENT-LVL III: ICD-10-PCS | Mod: PBBFAC,,, | Performed by: INTERNAL MEDICINE

## 2020-07-16 PROCEDURE — 99214 OFFICE O/P EST MOD 30 MIN: CPT | Mod: S$GLB,,, | Performed by: INTERNAL MEDICINE

## 2020-07-16 NOTE — PROGRESS NOTES
Subjective:      Patient ID: Antoni Felipe is a 34 y.o. male.    Chief Complaint:  Thyroid Cancer      History of Present Illness  Mr. Felipe presents for follow up of thyroid cancer and postoperative hypothyroidism. Last visit 6/2019.     Pt is here for management of differentiated thyroid cancer and postsurgical hypothyroidism.  S/p thyroidectomy 6/2015     FINAL PATHOLOGIC DIAGNOSIS  1. TOTAL THYROIDECTOMY SPECIMEN SHOWING 3 FOCI OF PAPILLARY CARCINOMA. 1 IS IN THE  RIGHT LOBE, ONE IN THE ISTHMUS AND ONE IS IN THE LEFT LOBE. SEE SYNOPTIC REPORT:  PROCEDURE: TOTAL THYROIDECTOMY  LYMPH NODE SAMPLING: CENTRAL NECK DISSECTION  TUMOR FOCALITY: MULTIFOCAL (3)  TUMOR LATERALITY: RIGHT LOBE, LEFT LOBE AND ISTHMUS  TUMOR SIZE: 1.1 CM  HISTOLOGIC TYPE: PAPILLARY CARCINOMA, CLASSICAL  MARGINS: UNINVOLVED BY PAPILLARY CARCINOMA. THE TUMOR APPROACHES BUT DOES NOT  INVOLVE THE OVERLYING INKED SURFACE IN BOTH THE RIGHT LOBE AND ISTHMUS.  ANGIOINVASION: NOT IDENTIFIED  LYMPHATIC INVASION: NOT IDENTIFIED  EXTRATHYROIDAL EXTENSION: NOT IDENTIFIED  TUMOR STAGE: pT1 b N1a  2. SPECIMEN LABELED CENTRAL NECK CONTENTS SHOWS 2 OUT OF 3 (2/3) LYMPH NODES WITH  METASTATIC PAPILLARY CARCINOMA.     No family history of thyroid cancer.  No history of head or neck irradiation.     Still with mild hypocalcemia.  PTH is detectable but inappropriately in the low normal range  Gets occasional numbness and tingling on the nasal bridge every 3-4 months, no perioral numbness/tingling or muscle cramping  Currently he takes 2000 mg of calcium carbonate at bedtime. He rarely has symptoms of hypocalcemia on this regimen.      No neck pain or dysphagia.     Currently taking 200 mcg daily of thyroid hormone. Was supposed to be taking a half tablet one day per week but he has not been remembering to do this. Takes thyroid hormone on empty stomach and separates from other foods and meds.      Has always felt sluggish since surgery. Weight  fluctuates. Regular BM's. No heat or cold intolerance.  No heart palpitations, tremors or increased anxiousness.      Thyroid US (9/6/18)     Impression:  Structure consistent with lymph node in L thyroid bed with higher risk characteristics (height/width ratio >0.7 in AP view, lack of hilum).  There is another lymph node in L level IIA without clear hilum.     FNA Left level 2A LN, and left level VI lymph node (likely residual thyroid tissue):  FINAL PATHOLOGIC DIAGNOSIS  1. FINE-NEEDLE ASPIRATE OF LEFT LEVEL 6 LYMPH NODE:  A SMALL NUMBER OF CLUSTERS OF CELLS ARE PRESENT CONSISTENT WITH FOLLICULAR CELLS FROM  THE THYROID. THE CELLS ARE NOT OBVIOUSLY MALIGNANT, HOWEVER. THEY ARE IN CONJUNCTION  WITH FLUID WHICH IS CONSISTENT WITH COLLOID. SUCH AN ASPIRATE COULD COME FROM  INNOCUOUS THYROID TISSUE. ON THE OTHER HAND, IF THIS SPECIMEN DEFINITELY COMES FROM A  LYMPH NODE, THEN IT LIKELY REFLECTS METASTATIC CARCINOMA OF THYROID ORIGIN. THE  SPECIMEN DOES NOT HOWEVER HAVE ABUNDANT LYMPHOCYTES INDICATING THAT A LYMPH NODE  WAS SAMPLED.  2. FINE-NEEDLE ASPIRATE OF LEFT LEVEL 2A LYMPH NODE:  NO CARCINOMA IDENTIFIED  ABUNDANT LYMPHOCYTES INDICATE THAT A LYMPH NODE WAS SAMPLED     S/p 156 mCi of radioactive iodine in 7/2019:  No evidence of iodine-avid metastases.     Increased activity within the thyroid bed on this post-treatment study is most consistent with physiologic activity within the post-surgical thyroid remnant, although persistent neoplasm may appear similarly.    Neck ultrasound dated 7/14/2020:  Status post thyroidectomy.     Multiple subcentimeter foci in the isthmus and area of the left thyroid bed that represent residual versus recurrent thyroid tissue.  No suspicious adenopathy on today's exam.    Review of Systems   Constitutional: Negative for chills and fever.   Gastrointestinal: Negative for nausea and vomiting.       Objective:   Physical Exam  Vitals signs and nursing note reviewed.     No thyroid  tissue palpated  No tremor  No tachycardia  DTR's 2 +    BP Readings from Last 3 Encounters:   07/16/20 124/78   03/03/20 130/74   06/19/19 121/79     Wt Readings from Last 1 Encounters:   07/16/20 1502 108.9 kg (240 lb 1.3 oz)       Body mass index is 30.82 kg/m².    Lab Review:   No results found for: HGBA1C  Lab Results   Component Value Date    CHOL 204 (H) 03/03/2020    HDL 48 03/03/2020    LDLCALC 129.4 03/03/2020    TRIG 133 03/03/2020    CHOLHDL 23.5 03/03/2020     Lab Results   Component Value Date     03/03/2020    K 4.1 03/03/2020    CL 99 03/03/2020    CO2 27 03/03/2020    GLU 89 03/03/2020    BUN 19 03/03/2020    CREATININE 1.1 03/03/2020    CALCIUM 8.4 (L) 03/03/2020    PROT 7.4 09/30/2015    ALBUMIN 4.2 11/07/2018    BILITOT 1.5 (H) 09/30/2015    ALKPHOS 99 09/30/2015    AST 35 09/30/2015    ALT 46 (H) 09/30/2015    ANIONGAP 12 03/03/2020    ESTGFRAFRICA >60.0 03/03/2020    EGFRNONAA >60.0 03/03/2020    TSH 0.018 (L) 07/08/2020         Assessment and Plan     Thyroid cancer  --Patient with papillary thyroid cancer stage 1, YESI low risk for recurrence  --S/p total thyroidectomy in 2015 with 2 of 3 CN nodes positive for mets  --Was decided not to give him COSTA up front due to YESI low risk category  --Had stable low level Tg consistent with remnant that has been relatively stable  --Only ultrasound done since surgery showed a small area in left level VI that underwent FNA and was most consistent with benign thyroid tissue  --Had FNA of left level II lymph node that was benign  --Due to several non-specific foci in the thyroid bed it was decided to treat with COSTA  --Received 156 mCi in 7/2019 with only uptake in the neck  --Will aim to keep TSH moderately suppressed for now  --Tg has declined but detecable  --Will repeat Tg and ultrasound in one year    Postoperative hypothyroidism  --TSH below goal  --Will aim to keep TSH closer to 0.5  --Continue levothyroxine 200 mcg Mon-Sat with an additional  half tablet on Sunday only (he will try his best to remember the half tablet on Sunday)  --Repeat TSH in 6-8 weeks    Hypocalcemia  --Mild postsurgical hypoparathyroidism  --To continue calcium carbonate 2 gram daily       TSH in 6-8 weeks, follow up in one year with labs and ultrasound prior to appt    Lázaro Leslie M.D. Staff Endocrinology

## 2020-07-16 NOTE — ASSESSMENT & PLAN NOTE
--Patient with papillary thyroid cancer stage 1, YESI low risk for recurrence  --S/p total thyroidectomy in 2015 with 2 of 3 CN nodes positive for mets  --Was decided not to give him COSTA up front due to YESI low risk category  --Had stable low level Tg consistent with remnant that has been relatively stable  --Only ultrasound done since surgery showed a small area in left level VI that underwent FNA and was most consistent with benign thyroid tissue  --Had FNA of left level II lymph node that was benign  --Due to several non-specific foci in the thyroid bed it was decided to treat with COSTA  --Received 156 mCi in 7/2019 with only uptake in the neck  --Will aim to keep TSH moderately suppressed for now  --Tg has declined but detecable  --Will repeat Tg and ultrasound in one year

## 2020-07-16 NOTE — ASSESSMENT & PLAN NOTE
--TSH below goal  --Will aim to keep TSH closer to 0.5  --Continue levothyroxine 200 mcg Mon-Sat with an additional half tablet on Sunday only (he will try his best to remember the half tablet on Sunday)  --Repeat TSH in 6-8 weeks

## 2020-11-23 ENCOUNTER — LAB VISIT (OUTPATIENT)
Dept: LAB | Facility: HOSPITAL | Age: 35
End: 2020-11-23
Payer: COMMERCIAL

## 2020-11-23 ENCOUNTER — OFFICE VISIT (OUTPATIENT)
Dept: INTERNAL MEDICINE | Facility: CLINIC | Age: 35
End: 2020-11-23
Payer: COMMERCIAL

## 2020-11-23 VITALS
SYSTOLIC BLOOD PRESSURE: 120 MMHG | HEART RATE: 86 BPM | BODY MASS INDEX: 34.83 KG/M2 | HEIGHT: 74 IN | DIASTOLIC BLOOD PRESSURE: 78 MMHG | OXYGEN SATURATION: 99 % | WEIGHT: 271.38 LBS

## 2020-11-23 DIAGNOSIS — R21 RASH OF BODY: Primary | ICD-10-CM

## 2020-11-23 DIAGNOSIS — R21 RASH OF BODY: ICD-10-CM

## 2020-11-23 PROCEDURE — 36415 COLL VENOUS BLD VENIPUNCTURE: CPT

## 2020-11-23 PROCEDURE — 86038 ANTINUCLEAR ANTIBODIES: CPT

## 2020-11-23 PROCEDURE — 86235 NUCLEAR ANTIGEN ANTIBODY: CPT | Mod: 59

## 2020-11-23 PROCEDURE — 99999 PR PBB SHADOW E&M-EST. PATIENT-LVL IV: ICD-10-PCS | Mod: PBBFAC,,, | Performed by: STUDENT IN AN ORGANIZED HEALTH CARE EDUCATION/TRAINING PROGRAM

## 2020-11-23 PROCEDURE — 99214 OFFICE O/P EST MOD 30 MIN: CPT | Mod: S$GLB,,, | Performed by: FAMILY MEDICINE

## 2020-11-23 PROCEDURE — 86039 ANTINUCLEAR ANTIBODIES (ANA): CPT

## 2020-11-23 PROCEDURE — 86592 SYPHILIS TEST NON-TREP QUAL: CPT

## 2020-11-23 PROCEDURE — 86140 C-REACTIVE PROTEIN: CPT

## 2020-11-23 PROCEDURE — 85652 RBC SED RATE AUTOMATED: CPT

## 2020-11-23 PROCEDURE — 85025 COMPLETE CBC W/AUTO DIFF WBC: CPT

## 2020-11-23 PROCEDURE — 86703 HIV-1/HIV-2 1 RESULT ANTBDY: CPT

## 2020-11-23 PROCEDURE — 99214 PR OFFICE/OUTPT VISIT, EST, LEVL IV, 30-39 MIN: ICD-10-PCS | Mod: S$GLB,,, | Performed by: FAMILY MEDICINE

## 2020-11-23 PROCEDURE — 99999 PR PBB SHADOW E&M-EST. PATIENT-LVL IV: CPT | Mod: PBBFAC,,, | Performed by: STUDENT IN AN ORGANIZED HEALTH CARE EDUCATION/TRAINING PROGRAM

## 2020-11-23 RX ORDER — DICLOXACILLIN SODIUM 500 MG/1
500 CAPSULE ORAL EVERY 6 HOURS
Qty: 28 CAPSULE | Refills: 0 | Status: SHIPPED | OUTPATIENT
Start: 2020-11-23 | End: 2020-11-30

## 2020-11-23 NOTE — PROGRESS NOTES
Clinic Note  11/23/2020      Subjective:       Patient ID:  Antoni is a 35 y.o. male being seen for an urgent care visit.    Chief Complaint: No chief complaint on file.    Mr. Felipe is a 35 year-old male with a previous history of papillary thyroid cancer s/p total thyroidectomy in 2015 (complicated by post-op hypothyroidism and hypocalcemia) who is presenting to clinic as an urgent care visit with complaints of a new-onset pruritic rash. He developed a rash around two weeks ago that has spread in his arms and legs (sparing his palms and soles) and in some parts in his back. He additionally noted a small ulcer in the glans of his penis around that time as well. Denies fevers, chills, dysuria, frequency, discharge. He denies extramarital affairs or engaging in recent risky sexual behavior. He works for [employer] and incidentally around two weeks ago incorrectly stepped into a  drain cover with his RLE and had a superficial cut in his anterior shin (which he has been rinsing daily and applying disinfectant).       Review of Systems   Constitutional: Negative for chills, fever, malaise/fatigue and weight loss.   HENT: Negative for congestion, sinus pain, sore throat and tinnitus.    Eyes: Negative for discharge and redness.   Respiratory: Negative for cough, hemoptysis, sputum production and shortness of breath.    Cardiovascular: Negative for chest pain, palpitations, orthopnea and claudication.   Gastrointestinal: Negative for abdominal pain, constipation, diarrhea, nausea and vomiting.   Genitourinary: Negative for dysuria and hematuria.   Musculoskeletal: Negative for back pain, joint pain and myalgias.   Skin: Positive for itching and rash.   Neurological: Negative for dizziness, weakness and headaches.       Medication List with Changes/Refills   Current Medications    CALCIUM CARBONATE 400 MG (1,000 MG) CHEW    Take 1 tablet (400 mg total) by mouth 3 (three) times daily. Week 1 take 1 tablet three times a  "day  Week 2 take 1 tablets two times a day  Take extra if symptoms of hypocalcemia    LEVOTHYROXINE (SYNTHROID) 200 MCG TABLET    Take 1 tablet (200 mcg total) by mouth once daily.    TRAZODONE (DESYREL) 50 MG TABLET    Take 1-2 tablets ( mg total) by mouth nightly as needed for Insomnia.       Patient Active Problem List   Diagnosis    Rotator cuff syndrome    Thyroid cancer    Hypocalcemia    Cervical stenosis of spinal canal    Postoperative hypothyroidism           Objective:      There were no vitals taken for this visit.  Estimated body mass index is 30.82 kg/m² as calculated from the following:    Height as of 7/16/20: 6' 2" (1.88 m).    Weight as of 7/16/20: 108.9 kg (240 lb 1.3 oz).  Physical Exam   Constitutional: He is oriented to person, place, and time and well-developed, well-nourished, and in no distress. No distress.   HENT:   Head: Normocephalic and atraumatic.   Mouth/Throat: No oropharyngeal exudate.   No cervical, pre-auricular, or post-auricular LAD   Eyes: Pupils are equal, round, and reactive to light. Conjunctivae and EOM are normal. No scleral icterus.   Neck: Normal range of motion. No thyromegaly present.   Cardiovascular: Normal rate and regular rhythm.   Pulmonary/Chest: Effort normal and breath sounds normal. No respiratory distress.   Abdominal: Soft. He exhibits no distension.   Musculoskeletal: Normal range of motion.         General: No tenderness or edema.   Lymphadenopathy:     He has no cervical adenopathy.   Neurological: He is alert and oriented to person, place, and time. Gait normal.   Skin: Skin is warm. Rash noted. He is not diaphoretic. No erythema.   Diffuse maculo-papular(?) lesions in upper extremities and lower extremities                           Assessment and Plan:     Mr. Felipe is a 35 year-old male presenting with a new-onset pruritic rash.   Differentials include infectious vs vascular (underlying vasculitis)?    -Will give 7 day course of abx to " cover for MSSA  -Additionally will order AI and infectious workup  -Placed dermatology referral for further assistance with diagnostics and further mgmt          Patient seen and discussed with Dr. Devin Rahman MD  Internal Medicine PGY-2  [facility]

## 2020-11-24 LAB
ANA PATTERN 1: NORMAL
ANA SER QL IF: POSITIVE
ANA TITR SER IF: NORMAL {TITER}
BASOPHILS # BLD AUTO: 0.04 K/UL (ref 0–0.2)
BASOPHILS NFR BLD: 0.5 % (ref 0–1.9)
CRP SERPL-MCNC: 1.9 MG/L (ref 0–8.2)
DIFFERENTIAL METHOD: ABNORMAL
EOSINOPHIL # BLD AUTO: 0.1 K/UL (ref 0–0.5)
EOSINOPHIL NFR BLD: 1.1 % (ref 0–8)
ERYTHROCYTE [DISTWIDTH] IN BLOOD BY AUTOMATED COUNT: 11.5 % (ref 11.5–14.5)
ERYTHROCYTE [SEDIMENTATION RATE] IN BLOOD BY WESTERGREN METHOD: <2 MM/HR (ref 0–23)
HCT VFR BLD AUTO: 44.7 % (ref 40–54)
HGB BLD-MCNC: 15.1 G/DL (ref 14–18)
IMM GRANULOCYTES # BLD AUTO: 0.01 K/UL (ref 0–0.04)
IMM GRANULOCYTES NFR BLD AUTO: 0.1 % (ref 0–0.5)
LYMPHOCYTES # BLD AUTO: 1.7 K/UL (ref 1–4.8)
LYMPHOCYTES NFR BLD: 19.5 % (ref 18–48)
MCH RBC QN AUTO: 31.9 PG (ref 27–31)
MCHC RBC AUTO-ENTMCNC: 33.8 G/DL (ref 32–36)
MCV RBC AUTO: 95 FL (ref 82–98)
MONOCYTES # BLD AUTO: 0.6 K/UL (ref 0.3–1)
MONOCYTES NFR BLD: 7.4 % (ref 4–15)
NEUTROPHILS # BLD AUTO: 6.1 K/UL (ref 1.8–7.7)
NEUTROPHILS NFR BLD: 71.4 % (ref 38–73)
NRBC BLD-RTO: 0 /100 WBC
PLATELET # BLD AUTO: 258 K/UL (ref 150–350)
PMV BLD AUTO: 10.3 FL (ref 9.2–12.9)
RBC # BLD AUTO: 4.73 M/UL (ref 4.6–6.2)
RPR SER QL: NORMAL
WBC # BLD AUTO: 8.53 K/UL (ref 3.9–12.7)

## 2020-11-25 LAB
ANTI SM ANTIBODY: 0.07 RATIO (ref 0–0.99)
ANTI SM/RNP ANTIBODY: 0.06 RATIO (ref 0–0.99)
ANTI-SM INTERPRETATION: NEGATIVE
ANTI-SM/RNP INTERPRETATION: NEGATIVE
ANTI-SSA ANTIBODY: 0.05 RATIO (ref 0–0.99)
ANTI-SSA INTERPRETATION: NEGATIVE
ANTI-SSB ANTIBODY: 0.07 RATIO (ref 0–0.99)
ANTI-SSB INTERPRETATION: NEGATIVE
DSDNA AB SER-ACNC: NORMAL [IU]/ML
HIV 1+2 AB+HIV1 P24 AG SERPL QL IA: NEGATIVE

## 2020-11-30 ENCOUNTER — DOCUMENTATION ONLY (OUTPATIENT)
Dept: INTERNAL MEDICINE | Facility: CLINIC | Age: 35
End: 2020-11-30

## 2020-11-30 NOTE — PROGRESS NOTES
Called patient and discussed lab findings.   Will follow with dermatology 12/08.       Sarah Rahman MD  Internal Medicine PGY-2

## 2020-12-04 ENCOUNTER — OFFICE VISIT (OUTPATIENT)
Dept: DERMATOLOGY | Facility: CLINIC | Age: 35
End: 2020-12-04
Payer: COMMERCIAL

## 2020-12-04 VITALS — BODY MASS INDEX: 34.79 KG/M2 | WEIGHT: 271 LBS

## 2020-12-04 DIAGNOSIS — R21 RASH OF BODY: ICD-10-CM

## 2020-12-04 DIAGNOSIS — L30.9 DERMATITIS: Primary | ICD-10-CM

## 2020-12-04 PROCEDURE — 99203 OFFICE O/P NEW LOW 30 MIN: CPT | Mod: S$GLB,,, | Performed by: DERMATOLOGY

## 2020-12-04 PROCEDURE — 99999 PR PBB SHADOW E&M-EST. PATIENT-LVL III: ICD-10-PCS | Mod: PBBFAC,,, | Performed by: DERMATOLOGY

## 2020-12-04 PROCEDURE — 87070 CULTURE OTHR SPECIMN AEROBIC: CPT

## 2020-12-04 PROCEDURE — 99999 PR PBB SHADOW E&M-EST. PATIENT-LVL III: CPT | Mod: PBBFAC,,, | Performed by: DERMATOLOGY

## 2020-12-04 PROCEDURE — 99203 PR OFFICE/OUTPT VISIT, NEW, LEVL III, 30-44 MIN: ICD-10-PCS | Mod: S$GLB,,, | Performed by: DERMATOLOGY

## 2020-12-04 RX ORDER — PREDNISOLONE 15 MG/5ML
SOLUTION ORAL
Qty: 120 ML | Refills: 0 | Status: SHIPPED | OUTPATIENT
Start: 2020-12-04 | End: 2021-01-06 | Stop reason: SDUPTHER

## 2020-12-04 RX ORDER — TACROLIMUS 1 MG/G
OINTMENT TOPICAL 2 TIMES DAILY
Qty: 30 G | Refills: 3 | Status: SHIPPED | OUTPATIENT
Start: 2020-12-04

## 2020-12-04 RX ORDER — TRIAMCINOLONE ACETONIDE 1 MG/G
CREAM TOPICAL
Qty: 454 G | Refills: 3 | Status: SHIPPED | OUTPATIENT
Start: 2020-12-04

## 2020-12-04 NOTE — LETTER
December 4, 2020      Sarah Rahman MD  [address] [address]           [facility] D[facility] 5th Fl  [address]  [place] [address]  Phone: [phone]  Fax: [phone]          Patient: Antoni Felipe   MR Number: [id number]   YOB: 1985   Date of Visit: 12/4/2020       Dear Dr. Sarah Rahman:    Thank you for referring Antoni Felipe to me for evaluation. Attached you will find relevant portions of my assessment and plan of care.    If you have questions, please do not hesitate to call me. I look forward to following Antoni Felipe along with you.    Sincerely,    Leonor Escobar MD    Enclosure  CC:  No Recipients    If you would like to receive this communication electronically, please contact [email or web address] or [phone] to request more information on [employer] Link access.    For providers and/or their staff who would like to refer a patient to [facility], please contact us through our one-stop-shop provider referral line, [facility], at [phone].    If you feel you have received this communication in error or would no longer like to receive these types of communications, please e-mail [email or web address]

## 2020-12-04 NOTE — PROGRESS NOTES
I have seen the patient, reviewed the house staff's history and physical, assessment and plan. I have personally interviewed and reexamined the patient at bedside and agree with the findings, assessment and plan.

## 2020-12-04 NOTE — PROGRESS NOTES
"  Subjective:       Patient ID:  Antoni Parish Felipe is a 35 y.o. male who presents for   Chief Complaint   Patient presents with    Rash     bilateral legs, itching, groin, itching     11/23"Mr. Felipe is a 35 year-old male with a previous history of papillary thyroid cancer s/p total thyroidectomy in 2015 (complicated by post-op hypothyroidism and hypocalcemia) who is presenting to clinic as an urgent care visit with complaints of a new-onset pruritic rash. He developed a rash around two weeks ago that has spread in his arms and legs (sparing his palms and soles) and in some parts in his back. He additionally noted a small ulcer in the glans of his penis around that time as well. Denies fevers, chills, dysuria, frequency, discharge. He denies extramarital affairs or engaging in recent risky sexual behavior. He works for [employer] and incidentally around two weeks ago incorrectly stepped into a  drain cover with his RLE and had a superficial cut in his anterior shin (which he has been rinsing daily and applying disinfectant).  Mr. Felipe is a 35 year-old male presenting with a new-onset pruritic rash.   Differentials include infectious vs vascular (underlying vasculitis)?     -Will give 7 day course of abx to cover for MSSA  -Additionally will order AI and infectious workup  -Placed dermatology referral for further assistance with diagnostics and further mgmt"  Labs:    Lab Visit on 11/23/2020  Sed Rate                                      Date: 11/23/2020  Value: <2          Ref range: 0 - 23 mm/Hr       Status: Final  CRP                                           Date: 11/23/2020  Value: 1.9         Ref range: 0.0 - 8.2 mg/L     Status: Final  RPR                                           Date: 11/23/2020  Value: Non-reactive                     Ref range: Non-reactive       Status: Final  WBC                                           Date: 11/23/2020  Value: 8.53        Ref range: 3.90 - 12.70 K/uL  " Status: Final  RBC                                           Date: 11/23/2020  Value: 4.73        Ref range: 4.60 - 6.20 M/uL   Status: Final  Hemoglobin                                    Date: 11/23/2020  Value: 15.1        Ref range: 14.0 - 18.0 g/dL   Status: Final  Hematocrit                                    Date: 11/23/2020  Value: 44.7        Ref range: 40.0 - 54.0 %      Status: Final  MCV                                           Date: 11/23/2020  Value: 95          Ref range: 82 - 98 fL         Status: Final  MCH                                           Date: 11/23/2020  Value: 31.9*       Ref range: 27.0 - 31.0 pg     Status: Final  MCHC                                          Date: 11/23/2020  Value: 33.8        Ref range: 32.0 - 36.0 g/dL   Status: Final  RDW                                           Date: 11/23/2020  Value: 11.5        Ref range: 11.5 - 14.5 %      Status: Final  Platelets                                     Date: 11/23/2020  Value: 258         Ref range: 150 - 350 K/uL     Status: Final  MPV                                           Date: 11/23/2020  Value: 10.3        Ref range: 9.2 - 12.9 fL      Status: Final  Immature Granulocytes                         Date: 11/23/2020  Value: 0.1         Ref range: 0.0 - 0.5 %        Status: Final  Gran # (ANC)                                  Date: 11/23/2020  Value: 6.1         Ref range: 1.8 - 7.7 K/uL     Status: Final  Immature Grans (Abs)                          Date: 11/23/2020  Value: 0.01        Ref range: 0.00 - 0.04 K/uL   Status: Final  Lymph #                                       Date: 11/23/2020  Value: 1.7         Ref range: 1.0 - 4.8 K/uL     Status: Final  Mono #                                        Date: 11/23/2020  Value: 0.6         Ref range: 0.3 - 1.0 K/uL     Status: Final  Eos #                                         Date: 11/23/2020  Value: 0.1         Ref range: 0.0 - 0.5 K/uL     Status: Final  Baso #                                         Date: 11/23/2020  Value: 0.04        Ref range: 0.00 - 0.20 K/uL   Status: Final  nRBC                                          Date: 11/23/2020  Value: 0           Ref range: 0 /100 WBC         Status: Final  Gran %                                        Date: 11/23/2020  Value: 71.4        Ref range: 38.0 - 73.0 %      Status: Final  Lymph %                                       Date: 11/23/2020  Value: 19.5        Ref range: 18.0 - 48.0 %      Status: Final  Mono %                                        Date: 11/23/2020  Value: 7.4         Ref range: 4.0 - 15.0 %       Status: Final  Eosinophil %                                  Date: 11/23/2020  Value: 1.1         Ref range: 0.0 - 8.0 %        Status: Final  Basophil %                                    Date: 11/23/2020  Value: 0.5         Ref range: 0.0 - 1.9 %        Status: Final  Differential Method                           Date: 11/23/2020  Value: Automated     Status: Final  KELLEN Screen                                    Date: 11/23/2020  Value: Positive*   Ref range: Negative <1:80     Status: Final  HIV 1/2 Ag/Ab                                 Date: 11/23/2020  Value: Negative    Ref range: Negative           Status: Final  Anti Sm Antibody                              Date: 11/23/2020  Value: 0.07        Ref range: 0.00 - 0.99 Ratio  Status: Final  Anti-Sm Interpretation                        Date: 11/23/2020  Value: Negative    Ref range: Negative           Status: Final  Anti-SSA Antibody                             Date: 11/23/2020  Value: 0.05        Ref range: 0.00 - 0.99 Ratio  Status: Final  Anti-SSA Interpretation                       Date: 11/23/2020  Value: Negative    Ref range: Negative           Status: Final  Anti-SSB Antibody                             Date: 11/23/2020  Value: 0.07        Ref range: 0.00 - 0.99 Ratio  Status: Final  Anti-SSB Interpretation                       Date: 11/23/2020  Value:  Negative    Ref range: Negative           Status: Final  ds DNA Ab                                     Date: 11/23/2020  Value: Negative 1:10                     Ref range: Negative 1:10      Status: Final  Anti Sm/RNP Antibody                          Date: 11/23/2020  Value: 0.06        Ref range: 0.00 - 0.99 Ratio  Status: Final  Anti-Sm/RNP Interpretation                    Date: 11/23/2020  Value: Negative    Ref range: Negative           Status: Final  KELLEN PATTERN 1                                 Date: 11/23/2020  Value: Speckled      Status: Final  KELLEN Titer 1                                   Date: 11/23/2020  Value: 1:80          Status: Final  ------------        All essentially wnl\  Has history of allergy to nickel      Review of Systems   Constitutional: Negative for fever, chills, weight loss, weight gain, fatigue, night sweats and malaise.   Skin: Positive for rash and wears hat. Negative for daily sunscreen use and activity-related sunscreen use.   Hematologic/Lymphatic: Bruises/bleeds easily.        Objective:    Physical Exam   Constitutional: He appears well-developed and well-nourished. No distress.   Neurological: He is alert and oriented to person, place, and time. He is not disoriented.   Psychiatric: He has a normal mood and affect.   Skin:   Areas Examined (abnormalities noted in diagram):   Scalp / Hair Palpated and Inspected  Head / Face Inspection Performed  Neck Inspection Performed  Chest / Axilla Inspection Performed  Abdomen Inspection Performed  Genitals / Buttocks / Groin Inspection Performed  Back Inspection Performed  RUE Inspected  LUE Inspection Performed  RLE Inspected  LLE Inspection Performed  Nails and Digits Inspection Performed                   Diagram Legend     Erythematous scaling macule/papule c/w actinic keratosis       Vascular papule c/w angioma      Pigmented verrucoid papule/plaque c/w seborrheic keratosis      Yellow umbilicated papule c/w sebaceous hyperplasia       Irregularly shaped tan macule c/w lentigo     1-2 mm smooth white papules consistent with Milia      Movable subcutaneous cyst with punctum c/w epidermal inclusion cyst      Subcutaneous movable cyst c/w pilar cyst      Firm pink to brown papule c/w dermatofibroma      Pedunculated fleshy papule(s) c/w skin tag(s)      Evenly pigmented macule c/w junctional nevus     Mildly variegated pigmented, slightly irregular-bordered macule c/w mildly atypical nevus      Flesh colored to evenly pigmented papule c/w intradermal nevus       Pink pearly papule/plaque c/w basal cell carcinoma      Erythematous hyperkeratotic cursted plaque c/w SCC      Surgical scar with no sign of skin cancer recurrence      Open and closed comedones      Inflammatory papules and pustules      Verrucoid papule consistent consistent with wart     Erythematous eczematous patches and plaques     Dystrophic onycholytic nail with subungual debris c/w onychomycosis     Umbilicated papule    Erythematous-base heme-crusted tan verrucoid plaque consistent with inflamed seborrheic keratosis     Erythematous Silvery Scaling Plaque c/w Psoriasis     See annotation      Assessment / Plan:        Dermatitis possible allergic contact arms and legs vs drug eruption advil  Doubt viral exanthem       -     tacrolimus (PROTOPIC) 0.1 % ointment; Apply topically 2 (two) times daily.  Dispense: 30 g; Refill: 3 for penis also zeasorb powder  -     triamcinolone acetonide 0.1% (KENALOG) 0.1 % cream; Use bid  Dispense: 454 g; Refill: 3 arms and legs  -     prednisoLONE (PRELONE) 15 mg/5 mL syrup; One tsp tid swish and spit  Dispense: 120 mL; Refill: 0  -     Aerobic culture  Call if not improved one week or new lesions.   Rash of body  -     Ambulatory referral/consult to Dermatology             Follow up if symptoms worsen or fail to improve.

## 2020-12-10 LAB — BACTERIA SPEC AEROBE CULT: NORMAL

## 2020-12-14 ENCOUNTER — PATIENT MESSAGE (OUTPATIENT)
Dept: DERMATOLOGY | Facility: CLINIC | Age: 35
End: 2020-12-14

## 2020-12-14 DIAGNOSIS — L30.9 DERMATITIS: Primary | ICD-10-CM

## 2020-12-14 RX ORDER — PREDNISONE 20 MG/1
20 TABLET ORAL DAILY
Qty: 15 TABLET | Refills: 0 | Status: SHIPPED | OUTPATIENT
Start: 2020-12-14 | End: 2020-12-29

## 2021-01-03 ENCOUNTER — PATIENT MESSAGE (OUTPATIENT)
Dept: DERMATOLOGY | Facility: CLINIC | Age: 36
End: 2021-01-03

## 2021-01-06 ENCOUNTER — TELEPHONE (OUTPATIENT)
Dept: DERMATOLOGY | Facility: CLINIC | Age: 36
End: 2021-01-06

## 2021-01-06 DIAGNOSIS — L30.9 DERMATITIS: Primary | ICD-10-CM

## 2021-01-06 DIAGNOSIS — L30.9 DERMATITIS: ICD-10-CM

## 2021-01-06 RX ORDER — PREDNISONE 20 MG/1
20 TABLET ORAL DAILY
Qty: 15 TABLET | Refills: 0 | Status: SHIPPED | OUTPATIENT
Start: 2021-01-06 | End: 2021-01-21

## 2021-01-06 RX ORDER — PREDNISOLONE 15 MG/5ML
SOLUTION ORAL
Qty: 120 ML | Refills: 0 | Status: SHIPPED | OUTPATIENT
Start: 2021-01-06

## 2021-01-11 ENCOUNTER — CLINICAL SUPPORT (OUTPATIENT)
Dept: DERMATOLOGY | Facility: CLINIC | Age: 36
End: 2021-01-11
Payer: COMMERCIAL

## 2021-01-11 DIAGNOSIS — L30.9 DERMATITIS: ICD-10-CM

## 2021-01-11 PROCEDURE — 95044 PR ALLERGY PATCH TESTS: ICD-10-PCS | Mod: S$GLB,,, | Performed by: DERMATOLOGY

## 2021-01-11 PROCEDURE — 99999 PR PBB SHADOW E&M-EST. PATIENT-LVL II: ICD-10-PCS | Mod: PBBFAC,,,

## 2021-01-11 PROCEDURE — 95044 PATCH/APPLICATION TESTS: CPT | Mod: S$GLB,,, | Performed by: DERMATOLOGY

## 2021-01-11 PROCEDURE — 99999 PR PBB SHADOW E&M-EST. PATIENT-LVL II: CPT | Mod: PBBFAC,,,

## 2021-01-12 ENCOUNTER — PATIENT OUTREACH (OUTPATIENT)
Dept: ADMINISTRATIVE | Facility: OTHER | Age: 36
End: 2021-01-12

## 2021-01-13 ENCOUNTER — OFFICE VISIT (OUTPATIENT)
Dept: DERMATOLOGY | Facility: CLINIC | Age: 36
End: 2021-01-13
Payer: COMMERCIAL

## 2021-01-13 VITALS — WEIGHT: 271 LBS | BODY MASS INDEX: 34.79 KG/M2

## 2021-01-13 DIAGNOSIS — L30.9 DERMATITIS: Primary | ICD-10-CM

## 2021-01-13 PROCEDURE — 99212 OFFICE O/P EST SF 10 MIN: CPT | Mod: S$GLB,,, | Performed by: DERMATOLOGY

## 2021-01-13 PROCEDURE — 99999 PR PBB SHADOW E&M-EST. PATIENT-LVL III: CPT | Mod: PBBFAC,,, | Performed by: DERMATOLOGY

## 2021-01-13 PROCEDURE — 99212 PR OFFICE/OUTPT VISIT, EST, LEVL II, 10-19 MIN: ICD-10-PCS | Mod: S$GLB,,, | Performed by: DERMATOLOGY

## 2021-01-13 PROCEDURE — 99999 PR PBB SHADOW E&M-EST. PATIENT-LVL III: ICD-10-PCS | Mod: PBBFAC,,, | Performed by: DERMATOLOGY

## 2021-01-15 ENCOUNTER — OFFICE VISIT (OUTPATIENT)
Dept: DERMATOLOGY | Facility: CLINIC | Age: 36
End: 2021-01-15
Payer: COMMERCIAL

## 2021-01-15 VITALS — BODY MASS INDEX: 34.79 KG/M2 | WEIGHT: 271 LBS

## 2021-01-15 DIAGNOSIS — L30.9 DERMATITIS: Primary | ICD-10-CM

## 2021-01-15 PROCEDURE — 99212 PR OFFICE/OUTPT VISIT, EST, LEVL II, 10-19 MIN: ICD-10-PCS | Mod: S$GLB,,, | Performed by: DERMATOLOGY

## 2021-01-15 PROCEDURE — 99999 PR PBB SHADOW E&M-EST. PATIENT-LVL III: CPT | Mod: PBBFAC,,, | Performed by: DERMATOLOGY

## 2021-01-15 PROCEDURE — 99212 OFFICE O/P EST SF 10 MIN: CPT | Mod: S$GLB,,, | Performed by: DERMATOLOGY

## 2021-01-15 PROCEDURE — 99999 PR PBB SHADOW E&M-EST. PATIENT-LVL III: ICD-10-PCS | Mod: PBBFAC,,, | Performed by: DERMATOLOGY

## 2021-01-27 DIAGNOSIS — R76.8 ANA POSITIVE: Primary | ICD-10-CM

## 2021-02-01 DIAGNOSIS — G47.00 INSOMNIA, UNSPECIFIED TYPE: Primary | ICD-10-CM

## 2021-02-03 RX ORDER — TRAZODONE HYDROCHLORIDE 50 MG/1
TABLET ORAL
Qty: 180 TABLET | Refills: 0 | Status: SHIPPED | OUTPATIENT
Start: 2021-02-03 | End: 2021-05-05

## 2021-02-15 ENCOUNTER — OFFICE VISIT (OUTPATIENT)
Dept: RHEUMATOLOGY | Facility: CLINIC | Age: 36
End: 2021-02-15
Payer: COMMERCIAL

## 2021-02-15 VITALS
RESPIRATION RATE: 18 BRPM | HEART RATE: 77 BPM | WEIGHT: 268.5 LBS | HEIGHT: 74 IN | TEMPERATURE: 96 F | OXYGEN SATURATION: 99 % | DIASTOLIC BLOOD PRESSURE: 72 MMHG | BODY MASS INDEX: 34.46 KG/M2 | SYSTOLIC BLOOD PRESSURE: 122 MMHG

## 2021-02-15 DIAGNOSIS — R21 RASH: ICD-10-CM

## 2021-02-15 DIAGNOSIS — R76.8 ANA POSITIVE: Primary | ICD-10-CM

## 2021-02-15 DIAGNOSIS — E66.9 CLASS 1 OBESITY WITH BODY MASS INDEX (BMI) OF 34.0 TO 34.9 IN ADULT, UNSPECIFIED OBESITY TYPE, UNSPECIFIED WHETHER SERIOUS COMORBIDITY PRESENT: ICD-10-CM

## 2021-02-15 DIAGNOSIS — Z71.89 COUNSELING AND COORDINATION OF CARE: ICD-10-CM

## 2021-02-15 PROCEDURE — 99999 PR PBB SHADOW E&M-EST. PATIENT-LVL III: ICD-10-PCS | Mod: PBBFAC,,, | Performed by: INTERNAL MEDICINE

## 2021-02-15 PROCEDURE — 99999 PR PBB SHADOW E&M-EST. PATIENT-LVL III: CPT | Mod: PBBFAC,,, | Performed by: INTERNAL MEDICINE

## 2021-02-15 PROCEDURE — 99204 OFFICE O/P NEW MOD 45 MIN: CPT | Mod: S$GLB,,, | Performed by: INTERNAL MEDICINE

## 2021-02-15 PROCEDURE — 99204 PR OFFICE/OUTPT VISIT, NEW, LEVL IV, 45-59 MIN: ICD-10-PCS | Mod: S$GLB,,, | Performed by: INTERNAL MEDICINE

## 2021-04-16 ENCOUNTER — PATIENT MESSAGE (OUTPATIENT)
Dept: RESEARCH | Facility: HOSPITAL | Age: 36
End: 2021-04-16

## 2021-05-02 DIAGNOSIS — G47.00 INSOMNIA, UNSPECIFIED TYPE: ICD-10-CM

## 2021-05-05 RX ORDER — TRAZODONE HYDROCHLORIDE 50 MG/1
TABLET ORAL
Qty: 180 TABLET | Refills: 0 | Status: SHIPPED | OUTPATIENT
Start: 2021-05-05 | End: 2021-07-01

## 2021-07-01 DIAGNOSIS — G47.00 INSOMNIA, UNSPECIFIED TYPE: ICD-10-CM

## 2021-07-02 RX ORDER — TRAZODONE HYDROCHLORIDE 50 MG/1
TABLET ORAL
Qty: 180 TABLET | Refills: 0 | Status: SHIPPED | OUTPATIENT
Start: 2021-07-02 | End: 2021-10-02

## 2021-09-29 DIAGNOSIS — G47.00 INSOMNIA, UNSPECIFIED TYPE: ICD-10-CM

## 2021-10-02 RX ORDER — TRAZODONE HYDROCHLORIDE 50 MG/1
TABLET ORAL
Qty: 180 TABLET | Refills: 0 | Status: SHIPPED | OUTPATIENT
Start: 2021-10-02 | End: 2021-12-31

## 2022-01-10 ENCOUNTER — PATIENT MESSAGE (OUTPATIENT)
Dept: ENDOCRINOLOGY | Facility: CLINIC | Age: 37
End: 2022-01-10
Payer: COMMERCIAL

## 2022-01-19 ENCOUNTER — PATIENT MESSAGE (OUTPATIENT)
Dept: ADMINISTRATIVE | Facility: HOSPITAL | Age: 37
End: 2022-01-19
Payer: COMMERCIAL

## 2022-03-16 ENCOUNTER — PATIENT MESSAGE (OUTPATIENT)
Dept: ADMINISTRATIVE | Facility: HOSPITAL | Age: 37
End: 2022-03-16
Payer: COMMERCIAL

## 2022-04-02 DIAGNOSIS — G47.00 INSOMNIA, UNSPECIFIED TYPE: ICD-10-CM

## 2022-04-02 NOTE — TELEPHONE ENCOUNTER
Care Due:                  Date            Visit Type   Department     Provider  --------------------------------------------------------------------------------    Last Visit: None Found      None         None Found  Next Visit: None Scheduled  None         None Found                                                            Last  Test          Frequency    Reason                     Performed    Due Date  --------------------------------------------------------------------------------    Office Visit  12 months..  traZODone................  Not Found    Overdue    Powered by [employer] by [employer] Reference number: [id number].   4/02/2022 4:59:56 AM T

## 2022-04-04 NOTE — TELEPHONE ENCOUNTER
Refill Routing Note   Medication(s) are not appropriate for processing by [facility] for the following reason(s):      - Indication is outside of scope for [facility]  - Patient has not been seen in over 15 months by PCP    [facility] action(s):  Route Medication-related problems identified: Requires appointment     Medication Therapy Plan: DX INSOMNIA  Medication reconciliation completed: No     Appointments  past 12m or future 3m with PCP    Date Provider   Last Visit   3/3/2020 Devin Chacon MD   Next Visit   Visit date not found Devin Chacon MD   ED visits in past 90 days: 0        Note composed:10:32 AM 04/04/2022

## 2022-04-10 RX ORDER — TRAZODONE HYDROCHLORIDE 50 MG/1
TABLET ORAL
Qty: 180 TABLET | Refills: 0 | Status: SHIPPED | OUTPATIENT
Start: 2022-04-10 | End: 2022-06-29

## 2022-04-11 NOTE — TELEPHONE ENCOUNTER
Called pt to schedule f/u appt pt stated he moved to the [facility] so he changed PCP to Dr. Lino pt stated he think that's how you spell provider's name

## 2022-06-26 DIAGNOSIS — G47.00 INSOMNIA, UNSPECIFIED TYPE: ICD-10-CM

## 2022-06-26 NOTE — TELEPHONE ENCOUNTER
Refill Routing Note   Medication(s) are not appropriate for processing by [facility] for the following reason(s):      - Non-participating provider    [facility] action(s):  Route          Medication reconciliation completed: No     Appointments  past 12m or future 3m with PCP    Date Provider   Last Visit   3/3/2020 Devin Chacon MD   Next Visit   Visit date not found Devin Chacon MD   ED visits in past 90 days: 0        Note composed:10:08 AM 06/26/2022

## 2022-06-29 RX ORDER — TRAZODONE HYDROCHLORIDE 50 MG/1
TABLET ORAL
Qty: 180 TABLET | Refills: 0 | Status: SHIPPED | OUTPATIENT
Start: 2022-06-29

## 2022-07-12 ENCOUNTER — PATIENT OUTREACH (OUTPATIENT)
Dept: ADMINISTRATIVE | Facility: HOSPITAL | Age: 37
End: 2022-07-12
Payer: COMMERCIAL

## 2022-07-12 NOTE — PROGRESS NOTES
Health Maintenance Due   Topic Date Due    Hepatitis C Screening  Never done    Pneumococcal Vaccines (Age 0-64) (1 - PCV) Never done    TETANUS VACCINE  Never done    COVID-19 Vaccine (2 - Booster for Elida series) 05/27/2021        updated. Immunizations reconciled.     Anamika David LPN   Clinical Care Coordinator  Primary Care and Wellness

## 2022-07-19 ENCOUNTER — OFFICE VISIT (OUTPATIENT)
Dept: URGENT CARE | Facility: CLINIC | Age: 37
End: 2022-07-19
Payer: OTHER MISCELLANEOUS

## 2022-07-19 DIAGNOSIS — S83.8X1A SPRAIN OF OTHER LIGAMENT OF RIGHT KNEE, INITIAL ENCOUNTER: Primary | ICD-10-CM

## 2022-07-19 PROCEDURE — 99212 OFFICE O/P EST SF 10 MIN: CPT | Mod: S$GLB,,, | Performed by: EMERGENCY MEDICINE

## 2022-07-19 PROCEDURE — 99212 PR OFFICE/OUTPT VISIT, EST, LEVL II, 10-19 MIN: ICD-10-PCS | Mod: S$GLB,,, | Performed by: EMERGENCY MEDICINE

## 2022-07-19 NOTE — PROGRESS NOTES
Subjective:       Patient ID: Antoni Felipe is a 36 y.o. male.    Chief Complaint: Knee Injury (right)    New Workers comp injury 7/19/2022. Patient states that he was on the 2nd rung of the ladder when the ladder started to fall and he jumped off injuring his right knee. Pain 7/10. 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  There has been no swelling or bruising or numbness or tingling.  He states there is mild pain on varus stress and external rotation.  Negative anterior posterior drawer signs.  There is no direct impact and the patient is ambulatory without difficulty.  If having persistent pain will x-ray on Friday upon repeat visit.  Will encourage rest, ice, elevation when possible and anti-inflammatory like ibuprofen or Aleve.  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  He will return in 3 days.    Knee Injury  This is a new problem. The current episode started today. The problem occurs constantly. The problem has been unchanged. Associated symptoms include arthralgias, joint swelling and myalgias. Pertinent negatives include no numbness. The symptoms are aggravated by bending, standing, walking and twisting. He has tried nothing for the symptoms. The treatment provided no relief.     ROS    Musculoskeletal: Positive for pain, joint pain, joint swelling, pain with walking, muscle cramps and muscle ache.   Skin: Positive for bruising. Negative for wound, abrasion, laceration and erythema.   Neurological: Negative for numbness and tingling.        Objective:      Physical Exam  Vitals and nursing note reviewed.   Constitutional:       General: He is not in acute distress.     Appearance: Normal appearance. He is  well-developed. He is not ill-appearing, toxic-appearing or diaphoretic.   HENT:      Head: Normocephalic and atraumatic. No abrasion, contusion or laceration.      Jaw: No trismus.      Right Ear: Hearing, tympanic membrane, ear canal and external ear normal. No hemotympanum.      Left Ear: Hearing, tympanic membrane, ear canal and external ear normal. No hemotympanum.      Nose: Nose normal. No nasal deformity, mucosal edema or rhinorrhea.      Right Sinus: No maxillary sinus tenderness or frontal sinus tenderness.      Left Sinus: No maxillary sinus tenderness or frontal sinus tenderness.      Mouth/Throat:      Dentition: Normal dentition.      Pharynx: Uvula midline. No posterior oropharyngeal erythema or uvula swelling.   Eyes:      General: Lids are normal. No scleral icterus.        Right eye: No discharge.         Left eye: No discharge.      Conjunctiva/sclera: Conjunctivae normal.      Pupils: Pupils are equal, round, and reactive to light.      Comments: Sclera clear bilat   Neck:      Trachea: Trachea and phonation normal. No tracheal deviation.   Cardiovascular:      Rate and Rhythm: Normal rate and regular rhythm.      Pulses: Normal pulses.      Heart sounds: Normal heart sounds.   Pulmonary:      Effort: Pulmonary effort is normal. No respiratory distress.      Breath sounds: Normal breath sounds.   Abdominal:      General: Bowel sounds are normal. There is no distension.      Palpations: Abdomen is soft. There is no mass or pulsatile mass.      Tenderness: There is no abdominal tenderness.   Musculoskeletal:         General: Tenderness and signs of injury present. No deformity. Normal range of motion.      Cervical back: Full passive range of motion without pain, normal range of motion and neck supple. No rigidity. No spinous process tenderness or muscular tenderness.      Comments: VERY MILD TENDERNESS TO THE LATERAL ASPECT OF THE KNEE AND POSTEROMEDIAL ASPECT OF THE KNEE ON THE RIGHT.  NEGATIVE  ANTERIOR POSTERIOR DRAWER SIGNS.  DISTALLY NEUROVASCULARLY INTACT, NORMAL RANGE OF MOTION.  THERE IS NO SWELLING OR BRUISING NOTED.   Skin:     General: Skin is warm and dry.      Capillary Refill: Capillary refill takes less than 2 seconds.      Coloration: Skin is not pale.      Findings: No abrasion, bruising, burn, ecchymosis, erythema or laceration.   Neurological:      Mental Status: He is alert and oriented to person, place, and time.      GCS: GCS eye subscore is 4. GCS verbal subscore is 5. GCS motor subscore is 6.      Cranial Nerves: No cranial nerve deficit.      Sensory: No sensory deficit.      Motor: No abnormal muscle tone or seizure activity.      Coordination: Coordination normal.   Psychiatric:         Speech: Speech normal.         Behavior: Behavior normal. Behavior is cooperative.         Thought Content: Thought content normal.         Judgment: Judgment normal.         Assessment:       1. 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  There has been no swelling or bruising or numbness or tingling.  He states there is mild pain on varus stress and external rotation.  Negative anterior posterior drawer signs.  There is no direct impact and the patient is ambulatory without difficulty.  If having persistent pain will x-ray on Friday upon repeat visit.  Will encourage rest, ice, elevation when possible and anti-inflammatory like ibuprofen or Aleve.  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  He will return in 3 days.     Patient Instructions: Attention not to aggravate affected area, Apply ice 24-48 hours then apply heat/warm soaks, Elevated  affected area   Restrictions: Regular Duty  Follow up in about 3 days (around 7/22/2022).

## 2022-07-19 NOTE — LETTER
[facility] - Occupational Health  [address] [address]  Phone: [phone]  Fax: [phone]  [facility]: [id number]    Pt Name: Antoni Felipe  Injury Date: 07/19/2022   Employee ID: [id number] Date of First Treatment: 07/19/2022   Company: [email or web address]      Appointment Time: 04:30 PM Arrived: 4:30 PM   Provider: Robi Staley MD Time Out: 5:05 PM     Office Treatment:   1. Sprain of other ligament of right knee, initial encounter          Patient Instructions: Attention not to aggravate affected area, Apply ice 24-48 hours then apply heat/warm soaks, Elevated affected area      Restrictions: Regular Duty     Return Appointment: 7/22/2022 at 9:15 AM JOSE

## 2022-07-22 ENCOUNTER — OFFICE VISIT (OUTPATIENT)
Dept: URGENT CARE | Facility: CLINIC | Age: 37
End: 2022-07-22
Payer: OTHER MISCELLANEOUS

## 2022-07-22 VITALS
BODY MASS INDEX: 35.94 KG/M2 | DIASTOLIC BLOOD PRESSURE: 78 MMHG | HEIGHT: 74 IN | TEMPERATURE: 99 F | HEART RATE: 77 BPM | RESPIRATION RATE: 18 BRPM | SYSTOLIC BLOOD PRESSURE: 136 MMHG | WEIGHT: 280 LBS | OXYGEN SATURATION: 97 %

## 2022-07-22 DIAGNOSIS — M23.91 INTERNAL DERANGEMENT OF RIGHT KNEE: Primary | ICD-10-CM

## 2022-07-22 DIAGNOSIS — S83.8X1D SPRAIN OF OTHER LIGAMENT OF RIGHT KNEE, SUBSEQUENT ENCOUNTER: ICD-10-CM

## 2022-07-22 PROCEDURE — 99214 OFFICE O/P EST MOD 30 MIN: CPT | Mod: S$GLB,,, | Performed by: EMERGENCY MEDICINE

## 2022-07-22 PROCEDURE — 73562 XR KNEE 3 VIEW RIGHT: ICD-10-PCS | Mod: RT,S$GLB,, | Performed by: RADIOLOGY

## 2022-07-22 PROCEDURE — 99214 PR OFFICE/OUTPT VISIT, EST, LEVL IV, 30-39 MIN: ICD-10-PCS | Mod: S$GLB,,, | Performed by: EMERGENCY MEDICINE

## 2022-07-22 PROCEDURE — 73562 X-RAY EXAM OF KNEE 3: CPT | Mod: RT,S$GLB,, | Performed by: RADIOLOGY

## 2022-07-22 NOTE — PROGRESS NOTES
Subjective:       Patient ID: Antoni Felipe is a 36 y.o. male.    Chief Complaint: Knee Injury (right)    [employer] Workers comp injury 7/19/2022. Patient is here to follow up on right knee injury. Patient is taking OTC Tylenol and it is not helping much. He is using the RICE method. [place] 7[place]10 [facility]    PATIENT IS A 36-YEAR-OLD MALE WHO HURT HIS RIGHT KNEE COMING DOWN AWKWARDLY OFF A LADDER FROM TO RUNGS UP WITH PERSISTENT PAIN TODAY.  PAIN IS WORSE WITH BEARING WEIGHT AND BENDING AND FULLY EXTENDING.  PHYSICAL EXAM SHOWS MILD SWELLING MEDIALLY AND PAIN WITH EXTENSION BOTH MEDIALLY AND LATERALLY.  NO BRUISING NO BONY TENDERNESS.  DISTALLY NEUROVASCULARLY INTACT.  HE DOES WALK WITH A LIMP SECONDARY TO PAIN.  X-RAY WAS NOT PRESENT LAST VISIT THEREFORE X-RAYS ORDERED TODAY SHOWING NO ACUTE FRACTURE OR DISLOCATION.  HE HAS BEEN ABLE TO WORK REGULAR DUTY TAKING CARE NOT TO AGGRAVATE THE AFFECTED AREA AND WILL CONTINUE THIS.  HE IS HERE WITH SUPERVISOR ACCOMMODATING AN ET MAY HAVE.  AS X-RAYS ARE NEGATIVE AND HE IS HAVING SIGNIFICANT PAIN WITH RANGE OF MOTION AND BEARING WEIGHT, CONCERN FOR INTERNAL DERANGEMENT AND MRI OF THE RIGHT KNEE ORDERED TO RULE OUT LIGAMENTOUS INJURY OR OTHER SOFT TISSUE INJURY.  HE WILL CONTINUE ADVIL OR NAPROXEN OVER-THE-COUNTER AND REST, ICE, ELEVATE AND WILL RETURN IN 1 WEEK.    Knee Injury  This is a new problem. The current episode started today. The problem occurs constantly. The problem has been unchanged. Associated symptoms include arthralgias, joint swelling and myalgias. Pertinent negatives include no chest pain, chills, coughing, fever, neck pain, numbness or sore throat. The symptoms are aggravated by bending, standing, walking and twisting. He has tried nothing for the symptoms. The treatment provided no relief.       ROS    Constitution: Negative for chills and fever.   HENT: Negative for postnasal drip, sinus pain and sore throat.    Neck: Negative for neck pain and neck stiffness.    Cardiovascular: Negative for chest pain and palpitations.   Respiratory: Negative for cough and shortness of breath.    Genitourinary: Negative for dysuria and hematuria.   Musculoskeletal: Positive for pain, joint pain, joint swelling, pain with walking and muscle ache. Negative for muscle cramps.   Skin: Negative for wound, abrasion, laceration, erythema and bruising.   Neurological: Negative for altered mental status, numbness and tingling.   Psychiatric/Behavioral: Negative for altered mental status.        Objective:      Physical Exam  Vitals and nursing note reviewed.   Constitutional:       General: He is not in acute distress.     Appearance: Normal appearance. He is well-developed. He is not ill-appearing, toxic-appearing or diaphoretic.   HENT:      Head: Normocephalic and atraumatic. No abrasion, contusion or laceration.      Jaw: No trismus.      Right Ear: Hearing, tympanic membrane, ear canal and external ear normal. No hemotympanum.      Left Ear: Hearing, tympanic membrane, ear canal and external ear normal. No hemotympanum.      Nose: Nose normal. No nasal deformity, mucosal edema or rhinorrhea.      Right Sinus: No maxillary sinus tenderness or frontal sinus tenderness.      Left Sinus: No maxillary sinus tenderness or frontal sinus tenderness.      Mouth/Throat:      Dentition: Normal dentition.      Pharynx: Uvula midline. No posterior oropharyngeal erythema or uvula swelling.   Eyes:      General: Lids are normal. No scleral icterus.        Right eye: No discharge.         Left eye: No discharge.      Conjunctiva/sclera: Conjunctivae normal.      Pupils: Pupils are equal, round, and reactive to light.      Comments: Sclera clear bilat   Neck:      Trachea: Trachea and phonation normal. No tracheal deviation.   Cardiovascular:      Rate and Rhythm: Normal rate and regular rhythm.      Pulses: Normal pulses.      Heart sounds: Normal heart sounds.   Pulmonary:      Effort: Pulmonary effort is  normal. No respiratory distress.      Breath sounds: Normal breath sounds.   Abdominal:      General: Bowel sounds are normal. There is no distension.      Palpations: Abdomen is soft. There is no mass or pulsatile mass.      Tenderness: There is no abdominal tenderness.   Musculoskeletal:         General: Tenderness and signs of injury present. No deformity. Normal range of motion.      Cervical back: Full passive range of motion without pain, normal range of motion and neck supple. No rigidity. No spinous process tenderness or muscular tenderness.      Comments: VERY MILD TENDERNESS TO THE LATERAL ASPECT OF THE KNEE AND POSTEROMEDIAL ASPECT OF THE KNEE ON THE RIGHT.  NEGATIVE ANTERIOR POSTERIOR DRAWER SIGNS.  DISTALLY NEUROVASCULARLY INTACT, NORMAL RANGE OF MOTION.  THERE IS NO SWELLING OR BRUISING NOTED.  THERE IS PAIN WITH FULL EXTENSION AS WELL AS MODERATE FLEXION.  ANTALGIC GAIT.  NO LAXITY   Skin:     General: Skin is warm and dry.      Capillary Refill: Capillary refill takes less than 2 seconds.      Coloration: Skin is not pale.      Findings: No abrasion, bruising, burn, ecchymosis, erythema or laceration.   Neurological:      Mental Status: He is alert and oriented to person, place, and time.      GCS: GCS eye subscore is 4. GCS verbal subscore is 5. GCS motor subscore is 6.      Cranial Nerves: No cranial nerve deficit.      Sensory: No sensory deficit.      Motor: No abnormal muscle tone or seizure activity.      Coordination: Coordination normal.   Psychiatric:         Speech: Speech normal.         Behavior: Behavior normal. Behavior is cooperative.         Thought Content: Thought content normal.         Judgment: Judgment normal.           XRAY SHOWS NO FRACTURE NOR DISLOCATION  MRI ORDERED  Assessment:       1. Internal derangement of right knee    2. Sprain of other ligament of right knee, subsequent encounter        Plan:       PATIENT IS A 36-YEAR-OLD MALE WHO HURT HIS RIGHT KNEE COMING DOWN  AWKWARDLY OFF A LADDER FROM TO RUNGS UP WITH PERSISTENT PAIN TODAY.  PAIN IS WORSE WITH BEARING WEIGHT AND BENDING AND FULLY EXTENDING.  PHYSICAL EXAM SHOWS MILD SWELLING MEDIALLY AND PAIN WITH EXTENSION BOTH MEDIALLY AND LATERALLY.  NO BRUISING NO BONY TENDERNESS.  DISTALLY NEUROVASCULARLY INTACT.  HE DOES WALK WITH A LIMP SECONDARY TO PAIN.  X-RAY WAS NOT PRESENT LAST VISIT THEREFORE X-RAYS ORDERED TODAY SHOWING NO ACUTE FRACTURE OR DISLOCATION.  HE HAS BEEN ABLE TO WORK REGULAR DUTY TAKING CARE NOT TO AGGRAVATE THE AFFECTED AREA AND WILL CONTINUE THIS.  HE IS HERE WITH SUPERVISOR ACCOMMODATING AN ET MAY HAVE.  AS X-RAYS ARE NEGATIVE AND HE IS HAVING SIGNIFICANT PAIN WITH RANGE OF MOTION AND BEARING WEIGHT, CONCERN FOR INTERNAL DERANGEMENT AND MRI OF THE RIGHT KNEE ORDERED TO RULE OUT LIGAMENTOUS INJURY OR OTHER SOFT TISSUE INJURY.  HE WILL CONTINUE ADVIL OR NAPROXEN OVER-THE-COUNTER AND REST, ICE, ELEVATE AND WILL RETURN IN 1 WEEK.      TO NOTE THE PATIENT HAS BEEN INSTRUCTED TO NOT SQUAT OR LIFT HEAVY OR PROLONGED STANDING OR WALKING AND PATIENT AND SUPERVISOR REPORTS THAT HE CAN PERFORM HIS REGULAR DUTY WITH THESE RESTRICTIONS IN PLACE    Patient Instructions: Attention not to aggravate affected area, Apply ice 24-48 hours then apply heat/warm soaks, Elevated affected area   Restrictions: Regular Duty  Follow up in about 1 week (around 7/29/2022).

## 2022-07-22 NOTE — LETTER
[facility] - Occupational Health  [address] [address]  Phone: [phone]  Fax: [phone]  [facility]: [id number]    Pt Name: Antoni Felipe  Injury Date: 07/19/2022   Employee ID:  Date of First Treatment: 07/22/2022   Company: [employer]      Appointment Time: 09:15 AM Arrived: 09:15 AM   Provider: Robi Staley MD Time Out: 10:35 AM     Office Treatment:   1. Internal derangement of right knee    2. Sprain of other ligament of right knee, subsequent encounter          Patient Instructions: Attention not to aggravate affected area, Apply ice 24-48 hours then apply heat/warm soaks, Elevated affected area      Restrictions: Regular Duty     Return Appointment:   7/28/2022 at 09:35 AM       SH

## 2022-07-28 ENCOUNTER — OFFICE VISIT (OUTPATIENT)
Dept: URGENT CARE | Facility: CLINIC | Age: 37
End: 2022-07-28
Payer: OTHER MISCELLANEOUS

## 2022-07-28 VITALS
HEIGHT: 74 IN | SYSTOLIC BLOOD PRESSURE: 131 MMHG | DIASTOLIC BLOOD PRESSURE: 81 MMHG | WEIGHT: 280 LBS | TEMPERATURE: 98 F | BODY MASS INDEX: 35.94 KG/M2 | HEART RATE: 66 BPM | OXYGEN SATURATION: 97 %

## 2022-07-28 DIAGNOSIS — Z02.6 ENCOUNTER RELATED TO WORKER'S COMPENSATION CLAIM: ICD-10-CM
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
## 2022-07-28 DIAGNOSIS — S83.8X1D SPRAIN OF OTHER LIGAMENT OF RIGHT KNEE, SUBSEQUENT ENCOUNTER: Primary | ICD-10-CM

## 2022-07-28 PROCEDURE — 99214 PR OFFICE/OUTPT VISIT, EST, LEVL IV, 30-39 MIN: ICD-10-PCS | Mod: S$GLB,,, | Performed by: PHYSICIAN ASSISTANT

## 2022-07-28 PROCEDURE — 99214 OFFICE O/P EST MOD 30 MIN: CPT | Mod: S$GLB,,, | Performed by: PHYSICIAN ASSISTANT

## 2022-07-28 NOTE — LETTER
[facility] - Occupational Health  [address] [address]  Phone: [phone]  Fax: [phone]  [facility]: [id number]    Pt Name: Antoni Felipe  Injury Date: 07/19/2022   Employee ID: [id number] Date of  Treatment: 07/28/2022   Company: [employer]      Appointment Time: 9:35am Arrived:  9:30am   Provider: Jayjay Sumner PA-C Time Out: 10:40am     Office Treatment:   1. Sprain of other ligament of right knee, subsequent encounter    2. Encounter related to worker's compensation claim    3. Work related injury               Restrictions: Regular Duty (Attention not to aggravate right knee)     Return Appointment: 8/4/2022 at 9:00am.  EC

## 2022-07-28 NOTE — PROGRESS NOTES
Subjective:       Patient ID: Antoni Felipe is a 36 y.o. male.    Chief Complaint: Knee Injury (Right )    [employer] Workers comp injury 7/19/2022. Patient is here to follow up on right knee injury. Patient is taking OTC Tylenol and it is not helping much. He is using the RICE method. Patient reports a sharp pain when he tries to climb stairs or gets out of a car.  Pain 1/10 [facility]    35 y/o M presents for f/u right knee sprain. Pt reports no significant improvement since last office visit. His MRI is scheduled for Monday next week. He reports pain with climbing stairs or turning a certain way. He has pain about the medial and lateral aspects of the knee. He endorses popping, no locking or giving out. He is using ice and taking ibuprofen. He is working regular duty in the office. He reports previous right knee meniscal injury for which he had a knee scope in [id number]. He says he was pain free without limitations prior to this injury. MEB      Constitution: Negative.   HENT: Negative.    Neck: neck negative.   Cardiovascular: Negative.    Eyes: Negative.    Respiratory: Negative.    Gastrointestinal: Negative.    Endocrine: negative.   Musculoskeletal: Positive for joint pain, joint swelling, abnormal ROM of joint and pain with walking.   Allergic/Immunologic: Negative.         Objective:      Physical Exam  Vitals and nursing note reviewed.   Constitutional:       General: He is not in acute distress.     Appearance: He is well-developed. He is not diaphoretic.   HENT:      Head: Normocephalic and atraumatic.      Right Ear: Hearing and external ear normal.      Left Ear: Hearing and external ear normal.      Nose: Nose normal. No nasal deformity.   Eyes:      General: Lids are normal. No scleral icterus.     Conjunctiva/sclera: Conjunctivae normal.   Neck:      Trachea: Trachea normal.   Cardiovascular:      Pulses: Normal pulses.   Pulmonary:      Effort: Pulmonary effort is normal. No respiratory distress.       Breath sounds: No stridor.   Musculoskeletal:      Cervical back: Normal range of motion.      Right knee: Swelling and effusion present. No crepitus. Decreased range of motion (AROM flexion 90, extension 5). Tenderness present over the MCL, LCL and patellar tendon (inferior). No LCL laxity (pain with varus stress), MCL laxity (pain with valgus stress) or ACL laxity. Abnormal meniscus.      Instability Tests: Anterior Lachman test negative. Medial Frederic test positive and lateral Frederic test positive.   Skin:     General: Skin is warm and dry.      Capillary Refill: Capillary refill takes less than 2 seconds.   Neurological:      Mental Status: He is alert. He is not disoriented.      GCS: GCS eye subscore is 4. GCS verbal subscore is 5. GCS motor subscore is 6.      Sensory: No sensory deficit.   Psychiatric:         Attention and Perception: He is attentive.         Speech: Speech normal.         Behavior: Behavior normal.           XR KNEE 3 VIEW RIGHT    Result Date: 7/22/2022  EXAMINATION: XR KNEE 3 VIEW RIGHT CLINICAL HISTORY: Sprain of other specified parts of right knee, subsequent encounter TECHNIQUE: AP, lateral, and Merchant views of the right knee were performed. COMPARISON: None FINDINGS: No fracture or dislocation.  No bone destruction identified.  The tiny calcification noted adjacent to the anterior aspect of the patella     See above Electronically signed by: Keny Bingham MD Date:    07/22/2022 Time:    10:32    Assessment:        1. Sprain of other ligament of right knee, subsequent encounter    2. Encounter related to worker's compensation claim    3. Work related injury        Plan:       PE consistent with MCL, LCL and meniscal injury. MRI pending. RTC 6 days to review MRI results. Continue ibuprofen, ice, compression.   Pt with previous right knee meniscal tear s/p surgical intervention [id number]. I do not think this is contributory in any way as he was not having any pain or limitations  prior to work injury.        Restrictions: Regular Duty (Attention not to aggravate right knee)  Follow up in about 6 days (around 8/3/2022).

## 2022-08-01 ENCOUNTER — HOSPITAL ENCOUNTER (OUTPATIENT)
Dept: RADIOLOGY | Facility: HOSPITAL | Age: 37
Discharge: HOME | End: 2022-08-01
Attending: EMERGENCY MEDICINE
Payer: OTHER MISCELLANEOUS

## 2022-08-01 DIAGNOSIS — M23.91 INTERNAL DERANGEMENT OF RIGHT KNEE: ICD-10-CM

## 2022-08-01 DIAGNOSIS — S83.8X1D SPRAIN OF OTHER LIGAMENT OF RIGHT KNEE, SUBSEQUENT ENCOUNTER: ICD-10-CM

## 2022-08-01 PROCEDURE — 73721 MRI KNEE WITHOUT CONTRAST RIGHT: ICD-10-PCS | Mod: 26,RT,, | Performed by: RADIOLOGY

## 2022-08-01 PROCEDURE — 73721 MRI JNT OF LWR EXTRE W/O DYE: CPT | Mod: TC,RT

## 2022-08-01 PROCEDURE — 73721 MRI JNT OF LWR EXTRE W/O DYE: CPT | Mod: 26,RT,, | Performed by: RADIOLOGY

## 2022-08-04 ENCOUNTER — OFFICE VISIT (OUTPATIENT)
Dept: URGENT CARE | Facility: CLINIC | Age: 37
End: 2022-08-04
Payer: OTHER MISCELLANEOUS

## 2022-08-04 VITALS
HEIGHT: 74 IN | OXYGEN SATURATION: 98 % | HEART RATE: 82 BPM | WEIGHT: 280 LBS | TEMPERATURE: 98 F | RESPIRATION RATE: 18 BRPM | BODY MASS INDEX: 35.94 KG/M2 | SYSTOLIC BLOOD PRESSURE: 135 MMHG | DIASTOLIC BLOOD PRESSURE: 75 MMHG

## 2022-08-04 DIAGNOSIS — S83.511A COMPLETE TEAR OF ANTERIOR CRUCIATE LIGAMENT OF RIGHT KNEE, INITIAL ENCOUNTER: Primary | ICD-10-CM

## 2022-08-04 DIAGNOSIS — S83.241A ACUTE MEDIAL MENISCUS TEAR OF RIGHT KNEE, INITIAL ENCOUNTER: ICD-10-CM

## 2022-08-04 DIAGNOSIS — M25.40 EFFUSION OF JOINT, UNSPECIFIED LOCATION: ICD-10-CM

## 2022-08-04 PROCEDURE — 99214 PR OFFICE/OUTPT VISIT, EST, LEVL IV, 30-39 MIN: ICD-10-PCS | Mod: S$GLB,,, | Performed by: EMERGENCY MEDICINE

## 2022-08-04 PROCEDURE — 99214 OFFICE O/P EST MOD 30 MIN: CPT | Mod: S$GLB,,, | Performed by: EMERGENCY MEDICINE

## 2022-08-04 NOTE — LETTER
[facility] - Occupational Health  [address] [address]  Phone: [phone]  Fax: [phone]  [facility]: [id number]    Pt Name: Antoni Felipe  Injury Date: 07/19/2022   Employee ID: [id number] Date of  Treatment: 08/04/2022   Company: [employer]      Appointment Time: 09:00 AM Arrived: 8:52 AM   Provider: Robi Staley MD Time Out: 10:00 AM     Office Treatment:   1. Complete tear of anterior cruciate ligament of right knee, initial encounter    2. Acute medial meniscus tear of right knee, initial encounter    3. Effusion of joint, unspecified location          Patient Instructions: Attention not to aggravate affected area, Daily home exercises/warm soaks, Apply ice 24-48 hours then apply heat/warm soaks, Elevated affected area, Use splint as directed, Referral to specialist to be scheduled, once authorized (ORTHOPEDICS REFERRAL DR GUERRA)      Restrictions: Sit or stand as needed, Avoid frequent bending/lifting/twisting, Avoid climbing/kneeling/squatting, No lifting/pushing/pulling more than 10 lbs, Sedentary work only, No Prolonged standing/walking, No driving company vehicles     Return Appointment: 8/25/2022 at 9:00 AM ZACHARY

## 2022-08-04 NOTE — PROGRESS NOTES
Subjective:       Patient ID: Antoni Felipe is a 36 y.o. male.    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 Patient is here to follow up on right knee injury. Patient is taking OTC Ibuprofen and it is helping. He is using the RICE method. Patient reports a sharp pain when he tries to climb stairs or gets out of a car or driving for a long time. No PT of HEP.   Pain 4/10 mcj    Patient reports pain with pivoting and bending and kneeling.  I have reviewed the MRI showing a complete tear of the ACL and posterior horn of the medial meniscus and possibly partial tear of the lateral meniscus.  Knee brace hinged provided and he is requesting referral to Orthopedics specifically on the [place] Dr. Esther Curtis.  Will make that referral.  He will continue ibuprofen, rice therapy, limited duty.  He does report significant pain with shifting from gas to brake and getting in and out of the car and will limit duties same as before specifically sit-to-stand is needed no prolonged walking no heavy lifting with the addition of no squatting no climbing and no driving of company vehicle as this is a source of pain and instability of the right knee.  I will set up an appointment for 3 weeks from now however he and  in the room understands that he may cancel this appointment if authorized, established, satisfied with plan of care from Orthopedics.    Knee Injury  Associated symptoms include arthralgias. Pertinent negatives include no joint swelling, myalgias or numbness.     ROS    Constitution: Negative.   HENT: Negative.    Neck: neck negative.   Cardiovascular: Negative.    Eyes: Negative.    Respiratory: Negative.    Gastrointestinal: Negative.    Endocrine: negative.   Musculoskeletal: Positive for pain, joint pain, abnormal ROM of joint and pain with walking. Negative for joint swelling, muscle cramps and muscle ache.   Skin: Negative for wound, abrasion, laceration, erythema and  bruising.   Allergic/Immunologic: Negative.    Neurological: Negative for numbness and tingling.        Objective:      Physical Exam  Vitals and nursing note reviewed.   Constitutional:       General: He is not in acute distress.     Appearance: He is well-developed. He is not diaphoretic.   HENT:      Head: Normocephalic and atraumatic.      Right Ear: Hearing and external ear normal.      Left Ear: Hearing and external ear normal.      Nose: Nose normal. No nasal deformity.   Eyes:      General: Lids are normal. No scleral icterus.     Conjunctiva/sclera: Conjunctivae normal.   Neck:      Trachea: Trachea normal.   Cardiovascular:      Pulses: Normal pulses.   Pulmonary:      Effort: Pulmonary effort is normal. No respiratory distress.      Breath sounds: No stridor.   Musculoskeletal:         General: Tenderness present.      Cervical back: Normal range of motion.      Right knee: Swelling and effusion present. No crepitus. Decreased range of motion (AROM flexion 90, extension 5). Tenderness present over the MCL, LCL and patellar tendon (inferior). ACL laxity present. No LCL laxity (pain with varus stress) or MCL laxity (pain with valgus stress). Abnormal meniscus.      Instability Tests: Anterior Lachman test negative. Medial Frederic test positive and lateral Frederic test positive.   Skin:     General: Skin is warm and dry.      Capillary Refill: Capillary refill takes less than 2 seconds.      Findings: No erythema.   Neurological:      Mental Status: He is alert. He is not disoriented.      GCS: GCS eye subscore is 4. GCS verbal subscore is 5. GCS motor subscore is 6.      Sensory: No sensory deficit.   Psychiatric:         Attention and Perception: He is attentive.         Speech: Speech normal.         Behavior: Behavior normal.       XR KNEE 3 VIEW RIGHT    Result Date: 7/22/2022  EXAMINATION: XR KNEE 3 VIEW RIGHT CLINICAL HISTORY: Sprain of other specified parts of right knee, subsequent encounter  TECHNIQUE: AP, lateral, and Merchant views of the right knee were performed. COMPARISON: None FINDINGS: No fracture or dislocation.  No bone destruction identified.  The tiny calcification noted adjacent to the anterior aspect of the patella     See above Electronically signed by: Keny Bingham MD Date:    07/22/2022 Time:    10:32    MRI Knee Without Contrast Right    Result Date: 8/1/2022  EXAMINATION: MRI KNEE WITHOUT CONTRAST RIGHT CLINICAL HISTORY: Knee trauma, internal derangement suspected, xray done;Sprain of other specified parts of right knee, subsequent encounter TECHNIQUE: Multiplanar, multisequence MRI of the right knee performed without contrast. COMPARISON: Right knee radiograph dated 07/22/2022 FINDINGS: Menisci: Intrasubstance signal abnormality with tear of the posterior horn medial meniscus.  Some fat heterogeneity and edema of the posteriorly adjacent menisco-capsular junction.  Signal heterogeneity at the posterior root of the lateral meniscus overlying the lateral tibial spine. Ligaments:  Signal abnormality with attenuation of fibers involving the more ACL concerning for tear.  The PCL, MCL, and LCL complex structures appear intact.  The medial and lateral retinaculum appear intact. Tendons:  Extensor mechanism is maintained. Cartilage: Patellofemoral: Focal deep fissure of the central lateral facet near the ridge and medial patellar facet.  No significant subchondral edema. Medial tibiofemoral: Small 3 mm chondral defect of the central weight-bearing femoral condyle (series 4, image 21). Lateral tibiofemoral: Articular cartilage appears maintained. Bone: No infiltrative marrow process.  Osseous contusion with focal linear subcortical T1 signal suggesting component of microtrabecular injury involving the central aspect of the lateral femoral condyle and posterior proximal tibia. Miscellaneous: Suprapatellar the joint effusion.  Visualized neurovascular bundle structures demonstrate nothing  unusual.     Complete ACL tear. Tear of the posterior horn medial meniscus. Signal heterogeneity at the posterior root of the lateral meniscus overlying the lateral tibial spine with component of partial tear not excluded. Marrow contusion with microtrabecular injury involving the central aspect of the lateral femoral condyle and posterior proximal tibia. Suprapatellar knee joint effusion and additional findings as above. Electronically signed by: Amando Pedro Date:    08/01/2022 Time:    16:02    Assessment:       1. Complete tear of anterior cruciate ligament of right knee, initial encounter    2. Acute medial meniscus tear of right knee, initial encounter    3. Effusion of joint, unspecified location        Plan:       Patient reports pain with pivoting and bending and kneeling.  I have reviewed the MRI showing a complete tear of the ACL and posterior horn of the medial meniscus and possibly partial tear of the lateral meniscus.  Knee brace hinged provided and he is requesting referral to Orthopedics specifically on the [place] Dr. Esther Curtis.  Will make that referral.  He will continue ibuprofen, rice therapy, limited duty.  He does report significant pain with shifting from gas to brake and getting in and out of the car and will limit duties same as before specifically sit-to-stand is needed no prolonged walking no heavy lifting with the addition of no squatting no climbing and no driving of company vehicle as this is a source of pain and instability of the right knee.  I will set up an appointment for 3 weeks from now however he and  in the room understands that he may cancel this appointment if authorized, established, satisfied with plan of care from Orthopedics.     Patient Instructions: Attention not to aggravate affected area, Daily home exercises/warm soaks, Apply ice 24-48 hours then apply heat/warm soaks, Elevated affected area, Use splint as directed, Referral to specialist to  be scheduled, once authorized (ORTHOPEDICS REFERRAL DR GUERRA)   Restrictions: Sit or stand as needed, Avoid frequent bending/lifting/twisting, Avoid climbing/kneeling/squatting, No lifting/pushing/pulling more than 10 lbs, Sedentary work only, No Prolonged standing/walking, No driving company vehicles  Follow up in about 3 weeks (around 8/25/2022) for MAY CANCEL THIS APPOINTMENT IF/WHEN ESTABLISHED WITH ORTHOPEDICS.

## 2022-08-05 ENCOUNTER — TELEPHONE (OUTPATIENT)
Dept: URGENT CARE | Facility: CLINIC | Age: 37
End: 2022-08-05
Payer: COMMERCIAL

## 2022-08-05 NOTE — TELEPHONE ENCOUNTER
MIGUEL Forman called about this patient.  States he has a follow up with Dr. Curtis on 08/09/2022.

## 2024-09-25 ENCOUNTER — OFFICE VISIT (OUTPATIENT)
Facility: CLINIC | Age: 39
End: 2024-09-25
Payer: COMMERCIAL

## 2024-09-25 DIAGNOSIS — C73 THYROID CANCER: ICD-10-CM

## 2024-09-25 DIAGNOSIS — E89.0 POSTOPERATIVE HYPOTHYROIDISM: ICD-10-CM

## 2024-09-25 DIAGNOSIS — E83.51 HYPOCALCEMIA: Primary | ICD-10-CM

## 2024-09-25 DIAGNOSIS — C73 THYROID CANCER: Primary | ICD-10-CM

## 2024-09-25 DIAGNOSIS — E83.51 HYPOCALCEMIA: ICD-10-CM

## 2024-09-25 PROCEDURE — G2211 COMPLEX E/M VISIT ADD ON: HCPCS | Mod: 95,,, | Performed by: STUDENT IN AN ORGANIZED HEALTH CARE EDUCATION/TRAINING PROGRAM

## 2024-09-25 PROCEDURE — 99204 OFFICE O/P NEW MOD 45 MIN: CPT | Mod: 95,,, | Performed by: STUDENT IN AN ORGANIZED HEALTH CARE EDUCATION/TRAINING PROGRAM

## 2024-09-25 RX ORDER — CALCITRIOL 0.25 UG/1
0.5 CAPSULE ORAL 2 TIMES DAILY
Qty: 120 CAPSULE | Refills: 11 | Status: SHIPPED | OUTPATIENT
Start: 2024-09-25

## 2024-09-25 RX ORDER — LEVOTHYROXINE SODIUM 175 UG/1
TABLET ORAL
Qty: 32 TABLET | Refills: 11 | Status: SHIPPED | OUTPATIENT
Start: 2024-09-25

## 2024-09-25 NOTE — ASSESSMENT & PLAN NOTE
Known history of hypocalcemia after prior thyroid surgery.  Normal PTH in that setting suggesting a component of hypoparathyroidism as a cause.  Has not had recent magnesium levels to see if there is an issue with low magnesium causing inability for PTH to be secreted adequately.      Plan:  - Check Renal panel, PTH and Magnesium in 2 months  - Continue Calcitriol 0.5 mcg twice daily  - Continue 2400 mg BID of calcium supplement (elemental amount unknown with current supplement)    - If issues with phos levels in the future then we may need to add binders or reduce Calcitriol dose.  - Aware of need to take extra calcium is symptoms of numbness tingling occur around the lips or finger tips

## 2024-09-25 NOTE — PROGRESS NOTES
ENDOCRINOLOGY NEW PATIENT VISIT: 09/25/2024    The patient location is: Personal Vehicle  The chief complaint leading to consultation is: Thyroid    Visit type: audiovisual    Face to Face time with patient: 25  45 minutes of total time spent on the encounter, which includes face to face time and non-face to face time preparing to see the patient (eg, review of tests), Obtaining and/or reviewing separately obtained history, Documenting clinical information in the electronic or other health record, Independently interpreting results (not separately reported) and communicating results to the patient/family/caregiver, or Care coordination (not separately reported).     Each patient to whom he or she provides medical services by telemedicine is:  (1) informed of the relationship between the physician and patient and the respective role of any other health care provider with respect to management of the patient; and (2) notified that he or she may decline to receive medical services by telemedicine and may withdraw from such care at any time.      Subjective:      Patient ID: Antoni Felipe is a 38 y.o. male.    Chief Complaint:  Thyroid Cancer History, Hypothyroidism    History of Present Illness  Antoni Felipe presents for evaluation and management of post-operative hypothyroidism following prior total thyroidectomy for thyroid cancer.  Also on treatment for hypocalcemia/hypoparathyroidism.  He did followed with [facility] originally and had his surgery in 2015 but stopped following with [facility] in 2020 when he transferred endocrine care to Dr. Rodriguez with [facility].  Dr. Rodriguez recently left his prior practice and Mr. Felipe is now re-establishing with [facility] for his care.  Last visit with [facility] endocrine was Dr. Leslie in 2020.  He did have labs recently with Dr. Rodriguez in July 2024.      He has remained compliant with levothyroxine and also has been on both calcitriol and calcium supplements.  He had labs done in  July showing mild TSH elevation.  Also noted was a mildly reduced calcium level.  He had increase of calcitriol from 0.25 mcg BID to 0.5 mcg BID at that time but has run low on supply so was back to 0.25 mcg dose recently.  Will occasionally feel symptoms of hypocalcemia with numbness and tingling.  He overall has had concerns for fatigue despite being on thyroid medication.  Some concerns of lower libido but testosterone testing in 2023 was normal for total and free testosterone levels.   No history of snoring or sleep apnea concerns.  He has issues with falling asleep and is on trazodone for this.           Regarding Thyroid Cancer:    - Current therapy:  Levothyroxine 175 mcg tablet every morning  - Not missing doses, taking appropriately      - Thyroid FNA:  6/5/2015 - right nodule    Right thyroid, nodule, fine-needle aspiration:  [facility] Thyroid Cytology Category: Malignant.  Positive for papillary carcinoma.      - Status post total thyroidectomy with right side CN neck dissection: 06/23/2015 - Dr. Carrillo    Pathology:  1. Total thyroidectomy specimen showing 3 foci of papillary carcinoma. 1 is in the right lobe, one in the isthmus and one is in the left lobe.   Procedure: total thyroidectomy  Lymph node sampling: central neck dissection  Tumor focality: multifocal (3)  Tumor laterality: right lobe, left lobe and isthmus  Tumor size: 1.1 cm  Histologic type: papillary carcinoma, classical  Margins: uninvolved by papillary carcinoma. the tumor approaches but does not involve the overlying inked surface in both the right lobe and isthmus.  Angioinvasion: not identified  Lymphatic invasion: not identified  Extrathyroidal extension: not identified  Tumor stage: pt1 b n1a  2. Specimen labeled central neck contents shows 2 out of 3 (2/3) lymph nodes with metastatic papillary carcinoma.      - Post treatment whole body scan: 08/06/2016  Impression:  No evidence of iodine-avid metastases.     Increased  activity within the thyroid bed on this post-treatment study is most consistent with physiologic activity within the post-surgical thyroid remnant, although persistent neoplasm may appear similarly.      - Post-op thyroglobulin level: 0.8 (8/11/15)    - FNA performed January 2019; Left level 2A LN and left level 4 LN (suspect residual thyroid tissue)    Pathology:  1. Fine-needle aspirate of left level 6 lymph node:  -- A small number of clusters of cells are present consistent with follicular cells from the thyroid. the cells are not obviously malignant, however. they are in conjunction with fluid which is consistent with colloid. such an aspirate could come from innocuous thyroid tissue. on the other hand, if this specimen definitely comes from a lymph node, then it likely reflects metastatic carcinoma of thyroid origin. the specimen does not however have abundant lymphocytes indicating that a lymph node was sampled.  2. Fine-needle aspirate of left level 2a lymph node:  -- No carcinoma identified  -- Abundant lymphocytes indicate that a lymph node was sampled      - Received 156 mCi of radioactive iodine:  July 2019       - Last thyroid Ultrasound: 07/08/2020    Impression:  Status post thyroidectomy.     Multiple subcentimeter foci in the isthmus and area of the left thyroid bed that represent residual versus recurrent thyroid tissue.  No suspicious adenopathy on today's exam.       Latest Reference Range & Units 11/07/18 08:26 06/21/19 09:15 07/08/20 09:08   TSH 0.400 - 4.000 uIU/mL 0.304 (L) 0.028 (L) 0.018 (L)   Thyroglobulin Antibody Screen <1.8 IU/mL <1.8 <1.8 <1.8   Thyroglobulin, Tumor Marker ng/mL 0.9 (H) 1.0 (H) 0.4 (H)   (L): Data is abnormally low  (H): Data is abnormally high          Regarding Hypocalcemia:    - Notable for decreased calcium levels following surgery on the thyroid in 2015  - PTH has been inappropriately normal in setting of low calcium levels  - No recent Phos levels to adjust  "Calcitriol with, no binders being needed    - Prior thyroid surgery revealed that the right inferior parathyroid was removed and the right superior was re-implanted.  Unclear if either left parathyroid left in situ     - Right superior parathyroid minced and re-implanted.       Latest Reference Range & Units 08/17/15 04:17 08/17/15 08:20 08/19/15 11:36 09/30/15 15:03 12/11/15 08:24 04/23/16 08:30 09/27/16 10:20 05/18/17 17:01 05/15/18 15:51 11/07/18 08:26 03/03/20 16:09   Calcium 8.7 - 10.5 mg/dL 7.0 (L)  7.5 (L) 7.8 (L) 8.1 (L) 7.7 (L) 8.3 (L)  8.7 8.5 (L) 8.4 (L)   Calcium Level Ionized 1.06 - 1.42 mmol/L         1.05 (L)     Phosphorus Level 2.7 - 4.5 mg/dL 5.4 (H)        4.8 (H) 4.1    Albumin 3.5 - 5.2 g/dL    4.0 4.0    4.4 4.2    PTH 9.0 - 77.0 pg/mL  28.0 27.0 28.0 27.0 29.0  15.0 26.0     (L): Data is abnormally low  (H): Data is abnormally high       Latest Reference Range & Units 07/06/15 10:42 12/11/15 08:24 07/28/18 08:14   Vitamin D 30 - 96 ng/mL 25 (L) 29 (L) 34   (L): Data is abnormally low      - Daily intake of Calcitriol:  0.25 mcg BID (is supposed to be 0.5 mcg BID - ran low on supply recently)  - Daily intake of calcium:  1200 mg cap, 2 caps BID  - Daily intake of vitamin D:  None  - Taking lithium or hydrochlorothiazide: no     - Clinically stable, with nonspecific complaints    ROS:   As above    Objective:     There were no vitals taken for this visit.  BP Readings from Last 3 Encounters:   08/04/22 135/75   07/28/22 131/81   07/22/22 136/78     Wt Readings from Last 1 Encounters:   08/04/22 0857 127 kg (280 lb)     There is no height or weight on file to calculate BMI.    Physical Exam  Constitutional:       Appearance: Normal appearance.   Neurological:      Mental Status: He is alert.   Psychiatric:         Mood and Affect: Mood normal.         Behavior: Behavior normal.        Lab Review:   No results found for: "HGBA1C"  Lab Results   Component Value Date    CHOL 204 (H) 03/03/2020    " HDL 48 03/03/2020    LDLCALC 129.4 03/03/2020    TRIG 133 03/03/2020    CHOLHDL 23.5 03/03/2020     Lab Results   Component Value Date     03/03/2020    K 4.1 03/03/2020    CL 99 03/03/2020    CO2 27 03/03/2020    GLU 89 03/03/2020    BUN 19 03/03/2020    CREATININE 1.1 03/03/2020    CALCIUM 8.4 (L) 03/03/2020    PROT 7.4 09/30/2015    ALBUMIN 4.2 11/07/2018    BILITOT 1.5 (H) 09/30/2015    ALKPHOS 99 09/30/2015    AST 35 09/30/2015    ALT 46 (H) 09/30/2015    ANIONGAP 12 03/03/2020    ESTGFRAFRICA >60.0 03/03/2020    EGFRNONAA >60.0 03/03/2020    TSH 0.018 (L) 07/08/2020     Vit D, 25-Hydroxy   Date Value Ref Range Status   07/28/2018 34 30 - 96 ng/mL Final     Comment:     Vitamin D deficiency.........<10 ng/mL                              Vitamin D insufficiency......10-29 ng/mL       Vitamin D sufficiency........> or equal to 30 ng/mL  Vitamin D toxicity............>100 ng/mL         Assessment and Plan     Postoperative hypothyroidism  TSH was recently noted to be above goal.  Symptoms of fatigue which have been present even in setting of normal TSH in the past.  Has been compliant with Levothyroxine dosing.  Goal TSH has been bottom half of normal for him due to thyroid cancer history.    Plan:  - Increase Levothyroxine to 7.5 tabs a week of 175 mcg dose (1 tab daily with extra 1/2 tab on Sundays)  - Repeat TSH level in 2 months    If TSH is at goal on repeat and still having symptoms fatigue then we can review possibility of adjustment to a T4/T3 regimen at that time.  Difficulty ongoing with calcium supplement being needed so ideally spacing would be 3-4 hours after thyroid medication for calcium supplements.    Thyroid cancer  Reviewed prior history, pathology reports, surgery notes and labs.  Has history of PTC multifocal involving lymph nodes which required initial total thyroidectomy with right central neck dissection in 2015 followed by [facility]STA (156 mCi) in 2019 after LN vs remnant tissue with  follicular cells present (non-specific so tx with COSTA as a result).  Has been monitored with TSH/Tg levels ever since.  No new symptoms in the neck recently but is due for repeat TG levels soon.      Plan:  - Goal TSH of low normal range  - Repeat TG with paired TSH in 2 months  - If TG increased beyond prior testing and concerning then we will consider a neck US at that time  - Continues on Levothyroxine with adjusted dose as planned    Hypocalcemia  Known history of hypocalcemia after prior thyroid surgery.  Normal PTH in that setting suggesting a component of hypoparathyroidism as a cause.  Has not had recent magnesium levels to see if there is an issue with low magnesium causing inability for PTH to be secreted adequately.      Plan:  - Check Renal panel, PTH and Magnesium in 2 months  - Continue Calcitriol 0.5 mcg twice daily  - Continue 2400 mg BID of calcium supplement (elemental amount unknown with current supplement)    - If issues with phos levels in the future then we may need to add binders or reduce Calcitriol dose.  - Aware of need to take extra calcium is symptoms of numbness tingling occur around the lips or finger tips        RTC in 1 year.  Labs in 2 months after dose adjustments to thyroid medicine.      **Visit today included increased complexity associated with the care of the problems addressed and managing the longitudinal care of the patient due to the serious and/or complex managed problems      Amando Swan DO     Disclaimer: This note has been generated using voice-recognition software. There may be typographical errors that have been missed during proof-reading.

## 2024-09-25 NOTE — Clinical Note
Please arrange a 1 year follow up visit.    I sent some labs to [facility] per his request which he will schedule himself in 2 months.  Thanks

## 2024-09-25 NOTE — ASSESSMENT & PLAN NOTE
Reviewed prior history, pathology reports, surgery notes and labs.  Has history of PTC multifocal involving lymph nodes which required initial total thyroidectomy with right central neck dissection in 2015 followed by [facility] (156 mCi) in 2019 after LN vs remnant tissue with follicular cells present (non-specific so tx with COSTA as a result).  Has been monitored with TSH/Tg levels ever since.  No new symptoms in the neck recently but is due for repeat TG levels soon.      Plan:  - Goal TSH of low normal range  - Repeat TG with paired TSH in 2 months  - If TG increased beyond prior testing and concerning then we will consider a neck US at that time  - Continues on Levothyroxine with adjusted dose as planned

## 2024-09-25 NOTE — PATIENT INSTRUCTIONS
Thank you for visiting with me during our virtual appointment today.      As discussed I would like you to adjust you dosing of levothyroxine.  For now and want you to add an extra half a tablet a week on Sundays.  I have adjusted your medication at your pharmacy to now be 7.5 tablets a week with that new change.    When we repeat your labs in 2 months I do want to check your thyroglobulin tumor marker.  I also want to check your calcium levels on a renal panel along with a magnesium level and a PTH level.  I did review your prior surgery reports which mentioned removal of a right-sided parathyroid gland but there was no mention of any removal of the left-sided glands.  I do know they implanted the right superior gland into a different area of your neck.  Sometimes we see issues with magnesium being very low as a cause for parathyroid hormone not working appropriately.  When we check labs we will have an idea if that is an issue.      For now you can continue on the calcitriol which I have refilled at the higher dose of 0.5 mcg twice a day.  You can continue on your current dose of calcium supplements for now as well.    If you are still having symptoms suggestive of low thyroid function on repeat labs in November we can consider adjusting your medications from levothyroxine to a combination of levothyroxine and Cytomel.  That sometimes can help with symptoms but is a bit more cumbersome as Cytomel or liothyronine requires twice a day dosing.  Our goal for you with regards to TSH is a low-normal level between 0.5 and 2.    Plan on hearing back from me in November when I have more lab results to review.  Reach out with any questions or concerns that may come up.

## 2024-09-25 NOTE — ASSESSMENT & PLAN NOTE
TSH was recently noted to be above goal.  Symptoms of fatigue which have been present even in setting of normal TSH in the past.  Has been compliant with Levothyroxine dosing.  Goal TSH has been bottom half of normal for him due to thyroid cancer history.    Plan:  - Increase Levothyroxine to 7.5 tabs a week of 175 mcg dose (1 tab daily with extra 1/2 tab on Sundays)  - Repeat TSH level in 2 months    If TSH is at goal on repeat and still having symptoms fatigue then we can review possibility of adjustment to a T4/T3 regimen at that time.  Difficulty ongoing with calcium supplement being needed so ideally spacing would be 3-4 hours after thyroid medication for calcium supplements.